# Patient Record
Sex: MALE | Race: WHITE | NOT HISPANIC OR LATINO | URBAN - METROPOLITAN AREA
[De-identification: names, ages, dates, MRNs, and addresses within clinical notes are randomized per-mention and may not be internally consistent; named-entity substitution may affect disease eponyms.]

---

## 2022-12-18 ENCOUNTER — HOSPITAL ENCOUNTER (OUTPATIENT)
Facility: HOSPITAL | Age: 52
Discharge: NURSING, REHAB OR LONG-TERM CARE | End: 2022-12-29
Attending: EMERGENCY MEDICINE | Admitting: INTERNAL MEDICINE
Payer: COMMERCIAL

## 2022-12-18 DIAGNOSIS — I63.9 CVA (CEREBRAL VASCULAR ACCIDENT): ICD-10-CM

## 2022-12-18 DIAGNOSIS — Q90.9 DOWN'S SYNDROME: Primary | ICD-10-CM

## 2022-12-18 DIAGNOSIS — R07.9 CHEST PAIN: ICD-10-CM

## 2022-12-18 DIAGNOSIS — R53.81 DEBILITY: ICD-10-CM

## 2022-12-18 DIAGNOSIS — R53.1 WEAKNESS: ICD-10-CM

## 2022-12-18 PROBLEM — E03.9 HYPOTHYROID: Status: ACTIVE | Noted: 2022-12-18

## 2022-12-18 LAB
ALBUMIN SERPL BCP-MCNC: 4 G/DL (ref 3.5–5.2)
ALP SERPL-CCNC: 89 U/L (ref 55–135)
ALT SERPL W/O P-5'-P-CCNC: 16 U/L (ref 10–44)
ANION GAP SERPL CALC-SCNC: 13 MMOL/L (ref 8–16)
AST SERPL-CCNC: 23 U/L (ref 10–40)
BASOPHILS # BLD AUTO: 0.06 K/UL (ref 0–0.2)
BASOPHILS NFR BLD: 0.6 % (ref 0–1.9)
BILIRUB SERPL-MCNC: 0.4 MG/DL (ref 0.1–1)
BILIRUB UR QL STRIP: NEGATIVE
BUN SERPL-MCNC: 22 MG/DL (ref 6–20)
CALCIUM SERPL-MCNC: 9.6 MG/DL (ref 8.7–10.5)
CHLORIDE SERPL-SCNC: 105 MMOL/L (ref 95–110)
CK SERPL-CCNC: 207 U/L (ref 20–200)
CLARITY UR: CLEAR
CO2 SERPL-SCNC: 24 MMOL/L (ref 23–29)
COLOR UR: COLORLESS
CREAT SERPL-MCNC: 1.3 MG/DL (ref 0.5–1.4)
DIFFERENTIAL METHOD: ABNORMAL
EOSINOPHIL # BLD AUTO: 0 K/UL (ref 0–0.5)
EOSINOPHIL NFR BLD: 0 % (ref 0–8)
ERYTHROCYTE [DISTWIDTH] IN BLOOD BY AUTOMATED COUNT: 13.3 % (ref 11.5–14.5)
EST. GFR  (NO RACE VARIABLE): >60 ML/MIN/1.73 M^2
GLUCOSE SERPL-MCNC: 87 MG/DL (ref 70–110)
GLUCOSE UR QL STRIP: NEGATIVE
HCT VFR BLD AUTO: 48.1 % (ref 40–54)
HGB BLD-MCNC: 15.7 G/DL (ref 14–18)
HGB UR QL STRIP: NEGATIVE
IMM GRANULOCYTES # BLD AUTO: 0.05 K/UL (ref 0–0.04)
IMM GRANULOCYTES NFR BLD AUTO: 0.5 % (ref 0–0.5)
KETONES UR QL STRIP: ABNORMAL
LEUKOCYTE ESTERASE UR QL STRIP: NEGATIVE
LYMPHOCYTES # BLD AUTO: 0.6 K/UL (ref 1–4.8)
LYMPHOCYTES NFR BLD: 5.5 % (ref 18–48)
MAGNESIUM SERPL-MCNC: 2.4 MG/DL (ref 1.6–2.6)
MCH RBC QN AUTO: 32.2 PG (ref 27–31)
MCHC RBC AUTO-ENTMCNC: 32.6 G/DL (ref 32–36)
MCV RBC AUTO: 99 FL (ref 82–98)
MONOCYTES # BLD AUTO: 0.5 K/UL (ref 0.3–1)
MONOCYTES NFR BLD: 4.6 % (ref 4–15)
NEUTROPHILS # BLD AUTO: 9.1 K/UL (ref 1.8–7.7)
NEUTROPHILS NFR BLD: 88.8 % (ref 38–73)
NITRITE UR QL STRIP: NEGATIVE
NRBC BLD-RTO: 0 /100 WBC
PH UR STRIP: 8 [PH] (ref 5–8)
PHOSPHATE SERPL-MCNC: 2.2 MG/DL (ref 2.7–4.5)
PLATELET # BLD AUTO: 208 K/UL (ref 150–450)
PMV BLD AUTO: 10.4 FL (ref 9.2–12.9)
POTASSIUM SERPL-SCNC: 4.7 MMOL/L (ref 3.5–5.1)
PROCALCITONIN SERPL IA-MCNC: 0.03 NG/ML
PROT SERPL-MCNC: 7.9 G/DL (ref 6–8.4)
PROT UR QL STRIP: NEGATIVE
RBC # BLD AUTO: 4.87 M/UL (ref 4.6–6.2)
SODIUM SERPL-SCNC: 142 MMOL/L (ref 136–145)
SP GR UR STRIP: 1.01 (ref 1–1.03)
T4 FREE SERPL-MCNC: 0.98 NG/DL (ref 0.71–1.51)
TROPONIN I SERPL DL<=0.01 NG/ML-MCNC: <0.006 NG/ML (ref 0–0.03)
TSH SERPL DL<=0.005 MIU/L-ACNC: 6.11 UIU/ML (ref 0.4–4)
URN SPEC COLLECT METH UR: ABNORMAL
UROBILINOGEN UR STRIP-ACNC: NEGATIVE EU/DL
WBC # BLD AUTO: 10.21 K/UL (ref 3.9–12.7)

## 2022-12-18 PROCEDURE — 63600175 PHARM REV CODE 636 W HCPCS: Performed by: EMERGENCY MEDICINE

## 2022-12-18 PROCEDURE — 84145 PROCALCITONIN (PCT): CPT | Performed by: INTERNAL MEDICINE

## 2022-12-18 PROCEDURE — G0378 HOSPITAL OBSERVATION PER HR: HCPCS

## 2022-12-18 PROCEDURE — 84443 ASSAY THYROID STIM HORMONE: CPT | Performed by: EMERGENCY MEDICINE

## 2022-12-18 PROCEDURE — 93010 ELECTROCARDIOGRAM REPORT: CPT | Mod: ,,, | Performed by: INTERNAL MEDICINE

## 2022-12-18 PROCEDURE — 81003 URINALYSIS AUTO W/O SCOPE: CPT | Performed by: EMERGENCY MEDICINE

## 2022-12-18 PROCEDURE — 82550 ASSAY OF CK (CPK): CPT | Performed by: EMERGENCY MEDICINE

## 2022-12-18 PROCEDURE — 96374 THER/PROPH/DIAG INJ IV PUSH: CPT

## 2022-12-18 PROCEDURE — 93005 ELECTROCARDIOGRAM TRACING: CPT

## 2022-12-18 PROCEDURE — 84484 ASSAY OF TROPONIN QUANT: CPT | Performed by: EMERGENCY MEDICINE

## 2022-12-18 PROCEDURE — 99285 EMERGENCY DEPT VISIT HI MDM: CPT | Mod: 25

## 2022-12-18 PROCEDURE — 85025 COMPLETE CBC W/AUTO DIFF WBC: CPT | Performed by: EMERGENCY MEDICINE

## 2022-12-18 PROCEDURE — 80053 COMPREHEN METABOLIC PANEL: CPT | Performed by: EMERGENCY MEDICINE

## 2022-12-18 PROCEDURE — 84100 ASSAY OF PHOSPHORUS: CPT | Performed by: EMERGENCY MEDICINE

## 2022-12-18 PROCEDURE — 83735 ASSAY OF MAGNESIUM: CPT | Performed by: EMERGENCY MEDICINE

## 2022-12-18 PROCEDURE — 84439 ASSAY OF FREE THYROXINE: CPT | Performed by: EMERGENCY MEDICINE

## 2022-12-18 PROCEDURE — 93010 EKG 12-LEAD: ICD-10-PCS | Mod: ,,, | Performed by: INTERNAL MEDICINE

## 2022-12-18 RX ORDER — GLUCAGON 1 MG
1 KIT INJECTION
Status: DISCONTINUED | OUTPATIENT
Start: 2022-12-18 | End: 2022-12-29 | Stop reason: HOSPADM

## 2022-12-18 RX ORDER — SODIUM CHLORIDE 0.9 % (FLUSH) 0.9 %
10 SYRINGE (ML) INJECTION EVERY 12 HOURS PRN
Status: DISCONTINUED | OUTPATIENT
Start: 2022-12-18 | End: 2022-12-29 | Stop reason: HOSPADM

## 2022-12-18 RX ORDER — LEVOTHYROXINE SODIUM 88 UG/1
88 TABLET ORAL
COMMUNITY

## 2022-12-18 RX ORDER — NALOXONE HCL 0.4 MG/ML
0.02 VIAL (ML) INJECTION
Status: DISCONTINUED | OUTPATIENT
Start: 2022-12-18 | End: 2022-12-29 | Stop reason: HOSPADM

## 2022-12-18 RX ORDER — LORAZEPAM 2 MG/ML
1 INJECTION INTRAMUSCULAR
Status: COMPLETED | OUTPATIENT
Start: 2022-12-18 | End: 2022-12-18

## 2022-12-18 RX ORDER — IBUPROFEN 200 MG
16 TABLET ORAL
Status: DISCONTINUED | OUTPATIENT
Start: 2022-12-18 | End: 2022-12-29 | Stop reason: HOSPADM

## 2022-12-18 RX ORDER — LORATADINE 10 MG/1
10 TABLET ORAL DAILY
COMMUNITY

## 2022-12-18 RX ORDER — IBUPROFEN 200 MG
24 TABLET ORAL
Status: DISCONTINUED | OUTPATIENT
Start: 2022-12-18 | End: 2022-12-29 | Stop reason: HOSPADM

## 2022-12-18 RX ADMIN — LORAZEPAM 1 MG: 2 INJECTION INTRAMUSCULAR; INTRAVENOUS at 12:12

## 2022-12-18 NOTE — SUBJECTIVE & OBJECTIVE
No past medical history on file.    No past surgical history on file.    Review of patient's allergies indicates:  No Known Allergies    No current facility-administered medications on file prior to encounter.     Current Outpatient Medications on File Prior to Encounter   Medication Sig    levothyroxine (SYNTHROID) 88 MCG tablet Take 88 mcg by mouth before breakfast.    loratadine (CLARITIN) 10 mg tablet Take 10 mg by mouth once daily.     Family History    None       Tobacco Use    Smoking status: Not on file    Smokeless tobacco: Not on file   Substance and Sexual Activity    Alcohol use: Not on file    Drug use: Not on file    Sexual activity: Not on file     Review of Systems   Unable to perform ROS: Patient nonverbal   Objective:     Vital Signs (Most Recent):  Temp: 97.9 °F (36.6 °C) (12/18/22 0841)  Pulse: 104 (12/18/22 1410)  Resp: 20 (12/18/22 1410)  BP: 134/68 (12/18/22 1410)  SpO2: 99 % (12/18/22 1410) Vital Signs (24h Range):  Temp:  [97.9 °F (36.6 °C)] 97.9 °F (36.6 °C)  Pulse:  [] 104  Resp:  [14-20] 20  SpO2:  [95 %-100 %] 99 %  BP: (111-134)/(60-76) 134/68     Weight: 66.6 kg (146 lb 12.8 oz)  There is no height or weight on file to calculate BMI.    Physical Exam  HENT:      Head: Normocephalic and atraumatic.   Cardiovascular:      Rate and Rhythm: Normal rate and regular rhythm.      Heart sounds: No murmur heard.  Pulmonary:      Effort: Pulmonary effort is normal. No respiratory distress.      Breath sounds: Normal breath sounds. No wheezing.   Abdominal:      General: Bowel sounds are normal. There is no distension.      Palpations: Abdomen is soft.      Tenderness: There is no abdominal tenderness.   Musculoskeletal:         General: No swelling.   Skin:     General: Skin is warm and dry.   Neurological:      Mental Status: He is alert.      Motor: Weakness present.           Significant Labs: All pertinent labs within the past 24 hours have been reviewed.  CBC:   Recent Labs   Lab  12/18/22  1053   WBC 10.21   HGB 15.7   HCT 48.1        CMP:   Recent Labs   Lab 12/18/22  1053      K 4.7      CO2 24   GLU 87   BUN 22*   CREATININE 1.3   CALCIUM 9.6   PROT 7.9   ALBUMIN 4.0   BILITOT 0.4   ALKPHOS 89   AST 23   ALT 16   ANIONGAP 13       Significant Imaging: I have reviewed all pertinent imaging results/findings within the past 24 hours.

## 2022-12-18 NOTE — H&P
[facility] - Emergency Dept.  [facility] Medicine  History & Physical    Patient Name: Albaro Langley  MRN: [id number]  Patient Class: OP- Observation  Admission Date: 12/18/2022  Attending Physician: Maday Brice MD   Primary Care Provider: Primary Doctor No         Patient information was obtained from relative(s) and ER records.     Subjective:     Principal Problem:Acute weakness    Chief Complaint:   Chief Complaint   Patient presents with    Cerebrovascular Accident     Patient coming from group home. Facility states they noticed this morning the patient was leaning/favoring his left side. Pt does not have previous left sided deficit, last known well 7pm last night. Patient non verbal.         HPI: The patient is 51 yo male with past medical history of Down's syndrome and hypothyroidism who presented to the ED with acute onset of weakness. He was last seen normal around 1900. His caregiver woke him of around 0530 this morning and told him get dressed. The patient fell and was unable to dress himself. He also seemed confused per the caregiver. He was transported to ED via EMS as caregiver concerned patient may have had a stroke given his weakness and leaning to the left. His brother and sister were at bedside. Patient recently started soiling his pants. CT head no acute findings. Patient  unable to complete MRI. Hospital medicine consulted. Discussed additional labs and repeat head CT in am. Patient placed in observation.    Past Medical History:   Diagnosis Date    Thyroid disease         past surgical history not pertinent    Review of patient's allergies indicates:  No Known Allergies    No current facility-administered medications on file prior to encounter.     Current Outpatient Medications on File Prior to Encounter   Medication Sig    levothyroxine (SYNTHROID) 88 MCG tablet Take 88 mcg by mouth before breakfast.    loratadine (CLARITIN) 10 mg tablet Take 10 mg by mouth once daily.     Family History     Reviewed not pertinent       Tobacco Use    Smoking status: Not on file    Smokeless tobacco: Not on file   Substance and Sexual Activity    Alcohol use: Not on file    Drug use: Not on file    Sexual activity: Not on file     Review of Systems   Unable to perform ROS: Patient nonverbal   Objective:     Vital Signs (Most Recent):  Temp: 97.9 °F (36.6 °C) (12/18/22 0841)  Pulse: 104 (12/18/22 1410)  Resp: 20 (12/18/22 1410)  BP: 134/68 (12/18/22 1410)  SpO2: 99 % (12/18/22 1410) Vital Signs (24h Range):  Temp:  [97.9 °F (36.6 °C)] 97.9 °F (36.6 °C)  Pulse:  [] 104  Resp:  [14-20] 20  SpO2:  [95 %-100 %] 99 %  BP: (111-134)/(60-76) 134/68     Weight: 66.6 kg (146 lb 12.8 oz)  There is no height or weight on file to calculate BMI.    Physical Exam  HENT:      Head: Normocephalic and atraumatic.   Cardiovascular:      Rate and Rhythm: Normal rate and regular rhythm.      Heart sounds: No murmur heard.  Pulmonary:      Effort: Pulmonary effort is normal. No respiratory distress.      Breath sounds: Normal breath sounds. No wheezing.   Abdominal:      General: Bowel sounds are normal. There is no distension.      Palpations: Abdomen is soft.      Tenderness: There is no abdominal tenderness.   Musculoskeletal:         General: No swelling.   Skin:     General: Skin is warm and dry.   Neurological:      Mental Status: He is alert.      Motor: Weakness present.           Significant Labs: All pertinent labs within the past 24 hours have been reviewed.  CBC:   Recent Labs   Lab 12/18/22  1053   WBC 10.21   HGB 15.7   HCT 48.1        CMP:   Recent Labs   Lab 12/18/22  1053      K 4.7      CO2 24   GLU 87   BUN 22*   CREATININE 1.3   CALCIUM 9.6   PROT 7.9   ALBUMIN 4.0   BILITOT 0.4   ALKPHOS 89   AST 23   ALT 16   ANIONGAP 13       Significant Imaging: I have reviewed all pertinent imaging results/findings within the past 24 hours.    Assessment/Plan:     * Acute weakness  CT head no acute  findings  MRI non-diagnostic due to movement  Obtain chest and pelvic x-ray  Labs unrevealing as well  Repeat head CT in am  PT/OT evaluation as unable to  ED      Down syndrome  Usually ambulates without assistance at group home  May need placement if unable to ambulate   Assist with meals      Hypothyroid  Continue levothyroxine TSH elevated but free T4 within normal limits      VTE Risk Mitigation (From admission, onward)         Ordered     IP VTE LOW RISK PATIENT  Once         12/18/22 1653     Place sequential compression device  Until discontinued         12/18/22 1653                   Maday Brice MD  Department of Hospital Medicine   '[facility] - Emergency Dept.

## 2022-12-18 NOTE — ED NOTES
Received report on patient, assumed care. Patient resting quietly, no distress. Respirations even/unlabored. Bed in low position, side rails up x 2, call light in reach.    87

## 2022-12-18 NOTE — ED PROVIDER NOTES
SCRIBE #1 NOTE: I, Lisset Brice/Joana Greenwood, am scribing for, and in the presence of, Yvonne Monson MD. I have scribed the entire note.      History      Chief Complaint   Patient presents with    Cerebrovascular Accident     Patient coming from group home. Facility states they noticed this morning the patient was leaning/favoring his left side. Pt does not have previous left sided deficit, last known well 7pm last night. Patient non verbal.        Review of patient's allergies indicates:  No Known Allergies     HPI   HPI    12/18/2022, 10:26 AM   History obtained from the pt's caregiver  HPI/ROS limited due to pt being nonverbal.      History of Present Illness: Albaro Langley is a 52 y.o. male patient  with a PMHx of Down's syndrome and hypothyroid who presents to the Emergency Department for a possible CVA. Per the pt's caregiver, the pt was acting normal yesterday and went to bed at 1900 normal. At 0530 this morning, the pt's caregiver woke the pt up and told him to put on his clothes, which he normally does himself. When he attempted to put his clothes on, he fell, so the pt's caregiver got him back onto the bed and told him to put his clothes on. Per the pt's caregiver, the pt seemed confused and unsure of what to do when she asked him to put on his clothes, so she walked him to the bathroom, put him on the commode, and asked him to put his clothes on again. The pt's caregiver left the bathroom, but then heard the pt fall again. After the pt's second fall, the pt's caregiver could not get the pt up, so she called for EMS. The pt's caregiver states that the pt is weak on his R side, cannot move his R leg, and is leaning to the L which is abnormal. Per the pt's caregiver, the pt is normally able to ambulate by himself without assistance.  Pt's caregiver states that the pt did not have any seizures or syncope today.      Arrival mode: EMS    PCP: Primary Doctor No       Past Medical History:  Past Medical  History:   Diagnosis Date    Thyroid disease        Past Surgical History:  No past surgical history on file.      Family History:  No family history on file.    Social History:  Social History     Tobacco Use    Smoking status: Not on file    Smokeless tobacco: Not on file   Substance and Sexual Activity    Alcohol use: Not on file    Drug use: Not on file    Sexual activity: Not on file       ROS   Review of Systems   Unable to perform ROS: Patient nonverbal     Physical Exam      Initial Vitals [12/18/22 0841]   BP Pulse Resp Temp SpO2   112/72 90 15 97.9 °F (36.6 °C) 95 %      MAP       --          Physical Exam  Nursing Notes and Vital Signs Reviewed.  Constitutional: Patient is in no acute distress. Well-developed and well-nourished.  Head: Atraumatic. Normocephalic.  Eyes: PERRL. EOM intact. Conjunctivae are not pale. No scleral icterus.  ENT: Mucous membranes are moist. Oropharynx is clear and symmetric.   Mouth: Poor dentition.   Neck: Supple. Full ROM. No lymphadenopathy.  Cardiovascular: Regular rate. Regular rhythm. No murmurs, rubs, or gallops. Distal pulses are 2+ and symmetric.  Pulmonary/Chest: No respiratory distress. Clear to auscultation bilaterally. No wheezing or rales.  Abdominal: Soft and non-distended.  There is no tenderness.  No rebound, guarding, or rigidity.   Musculoskeletal: Moves all extremities. No obvious deformities. No edema.  Skin: Warm and dry.  Neurological:  Awake. Pt is not able to stand, but has no focal weakness. Pt follows some directions, but not all commands which is normal, per the pt's caregiver. Pt has difficulty sitting up. Reflexes are 2+ and equal bilaterally.   Psychiatric: Normal affect. Good eye contact. Appropriate in content.    ED Course    Procedures  ED Vital Signs:  Vitals:    12/20/22 0315 12/20/22 0400 12/20/22 0512 12/20/22 0550   BP:  111/67 111/67    Pulse: 101 63 63 (!) 59   Resp:  18 18    Temp:  98.5 °F (36.9 °C) 98.5 °F (36.9 °C)    TempSrc:   Oral     SpO2:  95% 95%    Weight:       Height:        12/20/22 0806 12/20/22 1220 12/20/22 1635 12/20/22 2037   BP: (!) 95/56 133/66 136/62 108/60   Pulse: 65 100 101 102   Resp: 17 17 18 17   Temp: 99 °F (37.2 °C) 98.8 °F (37.1 °C) 97.9 °F (36.6 °C) 100 °F (37.8 °C)   TempSrc: Oral Oral Oral Oral   SpO2: (!) 94% 97% 97% 97%   Weight:       Height:        12/20/22 2115 12/20/22 2315 12/21/22 0013 12/21/22 0135   BP:   107/68    Pulse: 97 88 109 93   Resp:   17    Temp:   97.8 °F (36.6 °C)    TempSrc:   Oral    SpO2:   97%    Weight:   60.1 kg (132 lb 7.9 oz)    Height:        12/21/22 0335 12/21/22 0404 12/21/22 0405   BP:  (!) 99/56 (!) 99/56   Pulse: 81 84 88   Resp:  17 17   Temp:  97.5 °F (36.4 °C) 97.6 °F (36.4 °C)   TempSrc:  Oral Oral   SpO2:  (!) 94% (!) 94%   Weight:      Height:          Abnormal Lab Results:  Labs Reviewed   CBC W/ AUTO DIFFERENTIAL - Abnormal; Notable for the following components:       Result Value    MCV 99 (*)     MCH 32.2 (*)     Gran # (ANC) 9.1 (*)     Immature Grans (Abs) 0.05 (*)     Lymph # 0.6 (*)     Gran % 88.8 (*)     Lymph % 5.5 (*)     All other components within normal limits   COMPREHENSIVE METABOLIC PANEL - Abnormal; Notable for the following components:    BUN 22 (*)     All other components within normal limits   URINALYSIS, REFLEX TO URINE CULTURE - Abnormal; Notable for the following components:    Color, UA Colorless (*)     Ketones, UA Trace (*)     All other components within normal limits    Narrative:     Specimen Source->Urine   TSH - Abnormal; Notable for the following components:    TSH 6.106 (*)     All other components within normal limits   PHOSPHORUS - Abnormal; Notable for the following components:    Phosphorus 2.2 (*)     All other components within normal limits   CK - Abnormal; Notable for the following components:     (*)     All other components within normal limits   TROPONIN I   MAGNESIUM   T4, FREE        All Lab Results:  Results for orders  placed or performed during the hospital encounter of 12/18/22   CBC auto differential   Result Value Ref Range    WBC 10.21 3.90 - 12.70 K/uL    RBC 4.87 4.60 - 6.20 M/uL    Hemoglobin 15.7 14.0 - 18.0 g/dL    Hematocrit 48.1 40.0 - 54.0 %    MCV 99 (H) 82 - 98 fL    MCH 32.2 (H) 27.0 - 31.0 pg    MCHC 32.6 32.0 - 36.0 g/dL    RDW 13.3 11.5 - 14.5 %    Platelets 208 150 - 450 K/uL    MPV 10.4 9.2 - 12.9 fL    Immature Granulocytes 0.5 0.0 - 0.5 %    Gran # (ANC) 9.1 (H) 1.8 - 7.7 K/uL    Immature Grans (Abs) 0.05 (H) 0.00 - 0.04 K/uL    Lymph # 0.6 (L) 1.0 - 4.8 K/uL    Mono # 0.5 0.3 - 1.0 K/uL    Eos # 0.0 0.0 - 0.5 K/uL    Baso # 0.06 0.00 - 0.20 K/uL    nRBC 0 0 /100 WBC    Gran % 88.8 (H) 38.0 - 73.0 %    Lymph % 5.5 (L) 18.0 - 48.0 %    Mono % 4.6 4.0 - 15.0 %    Eosinophil % 0.0 0.0 - 8.0 %    Basophil % 0.6 0.0 - 1.9 %    Differential Method Automated    Comprehensive metabolic panel   Result Value Ref Range    Sodium 142 136 - 145 mmol/L    Potassium 4.7 3.5 - 5.1 mmol/L    Chloride 105 95 - 110 mmol/L    CO2 24 23 - 29 mmol/L    Glucose 87 70 - 110 mg/dL    BUN 22 (H) 6 - 20 mg/dL    Creatinine 1.3 0.5 - 1.4 mg/dL    Calcium 9.6 8.7 - 10.5 mg/dL    Total Protein 7.9 6.0 - 8.4 g/dL    Albumin 4.0 3.5 - 5.2 g/dL    Total Bilirubin 0.4 0.1 - 1.0 mg/dL    Alkaline Phosphatase 89 55 - 135 U/L    AST 23 10 - 40 U/L    ALT 16 10 - 44 U/L    Anion Gap 13 8 - 16 mmol/L    eGFR >60 >60 mL/min/1.73 m^2   Troponin I   Result Value Ref Range    Troponin I <0.006 0.000 - 0.026 ng/mL   Urinalysis, Reflex to Urine Culture Urine, Clean Catch    Specimen: Urine   Result Value Ref Range    Specimen UA Urine, Clean Catch     Color, UA Colorless (A) Yellow, Straw, Valerie    Appearance, UA Clear Clear    pH, UA 8.0 5.0 - 8.0    Specific Gravity, UA 1.010 1.005 - 1.030    Protein, UA Negative Negative    Glucose, UA Negative Negative    Ketones, UA Trace (A) Negative    Bilirubin (UA) Negative Negative    Occult Blood UA Negative  Negative    Nitrite, UA Negative Negative    Urobilinogen, UA Negative <2.0 EU/dL    Leukocytes, UA Negative Negative   TSH   Result Value Ref Range    TSH 6.106 (H) 0.400 - 4.000 uIU/mL   Magnesium   Result Value Ref Range    Magnesium 2.4 1.6 - 2.6 mg/dL   Phosphorus   Result Value Ref Range    Phosphorus 2.2 (L) 2.7 - 4.5 mg/dL   CPK   Result Value Ref Range     (H) 20 - 200 U/L   T4, Free   Result Value Ref Range    Free T4 0.98 0.71 - 1.51 ng/dL   Procalcitonin   Result Value Ref Range    Procalcitonin 0.03 <0.25 ng/mL   Hemoglobin A1c   Result Value Ref Range    Hemoglobin A1C 5.0 4.0 - 5.6 %    Estimated Avg Glucose 97 68 - 131 mg/dL   Echo   Result Value Ref Range    BSA 1.67 m2    LA WIDTH 2.20 cm    Left Ventricular Outflow Tract Mean Velocity 0.77 cm/s    Left Ventricular Outflow Tract Mean Gradient 2.51 mmHg    LVIDd 3.54 3.5 - 6.0 cm    IVS 1.31 (A) 0.6 - 1.1 cm    Posterior Wall 1.05 0.6 - 1.1 cm    Ao root annulus 3.39 cm    LVIDs 2.39 2.1 - 4.0 cm    FS 32 28 - 44 %    LA volume 15.03 cm3    STJ 3.36 cm    Ascending aorta 3.19 cm    LV mass 134.57 g    LA size 2.34 cm    Left Ventricle Relative Wall Thickness 0.59 cm    AV regurgitation pressure 1/2 time [id number] ms    AV mean gradient 6 mmHg    AV valve area 2.32 cm2    AV Velocity Ratio 0.72     AV index (prosthetic) 0.84     MV valve area p 1/2 method 2.92 cm2    E/A ratio 1.29     E wave deceleration time 259.42 msec    IVRT 74.22 msec    LVOT diameter 1.88 cm    LVOT area 2.8 cm2    LVOT peak manuel 1.02 m/s    LVOT peak VTI 21.60 cm    Ao peak manuel 1.41 m/s    Ao VTI 25.8 cm    RVOT peak manuel 0.66 m/s    RVOT peak VTI 15.1 cm    LVOT stroke volume 59.93 cm3    AV peak gradient 8 mmHg    PV mean gradient 1.42 mmHg    MV Peak E Manuel 0.72 m/s    AR Max Manuel 2.54 m/s    TR Max Manuel 2.55 m/s    MV stenosis pressure 1/2 time 75.23 ms    MV Peak A Manuel 0.56 m/s    LV Systolic Volume 19.92 mL    LV Systolic Volume Index 12.1 mL/m2    LV  Diastolic Volume 52.39 mL    LV Diastolic Volume Index 31.75 mL/m2    LA Volume Index 9.1 mL/m2    LV Mass Index 82 g/m2    RA Major Axis 3.52 cm    Left Atrium Minor Axis 3.42 cm    Left Atrium Major Axis 3.45 cm    Triscuspid Valve Regurgitation Peak Gradient 26 mmHg    EF 60 %         Imaging Results:  Imaging Results              X-Ray Pelvis Routine AP (Final result)  Result time 12/18/22 18:06:58   Procedure changed from X-Ray Pelvis Complete min 3 views     Final result by Jaxon Torres MD (12/18/22 18:06:58)                   Impression:      As above.      Electronically signed by: Jaxon Torres  Date:    12/18/2022  Time:    18:06               Narrative:    EXAMINATION:  XR PELVIS ROUTINE AP    CLINICAL HISTORY:  unable to bear weight;    TECHNIQUE:  AP view of the pelvis was performed.    COMPARISON:  None.    FINDINGS:  Severe degenerative change of the right hip.  No definite fracture identified on this single view exam.  Evaluation for fracture of the right hip is suboptimal.  If there is high clinical concern, further evaluation would be suggested.    Left hip appears intact mild degenerative change.                                       X-Ray Chest 1 View (Final result)  Result time 12/18/22 18:07:16      Final result by Jaxon Torres MD (12/18/22 18:07:16)                   Impression:      No acute abnormality.      Electronically signed by: Jaxon Torres  Date:    12/18/2022  Time:    18:07               Narrative:    EXAMINATION:  XR CHEST 1 VIEW    CLINICAL HISTORY:  weakness;    TECHNIQUE:  Single frontal view of the chest was performed.    COMPARISON:  None    FINDINGS:  The lungs are clear, with normal appearance of pulmonary vasculature and no pleural effusion or pneumothorax.    The cardiac silhouette is normal in size. The hilar and mediastinal contours are unremarkable.    Degenerative change of the shoulders.                                       MRI Brain Without Contrast  (Final result)  Result time 12/18/22 13:47:49      Final result by Alfonso Layton MD (12/18/22 13:47:49)                   Impression:      Nondiagnostic exam.  This exam can be repeated when the patient can cooperate with lying still for the exam or with sedation.      Electronically signed by: Alfonso Layton MD  Date:    12/18/2022  Time:    13:47               Narrative:    EXAMINATION:  MRI BRAIN WITHOUT CONTRAST    CLINICAL HISTORY:  Transient ischemic attack (TIA);    TECHNIQUE:  Multiplanar multisequence MR imaging of the brain was performed without contrast.    COMPARISON:  Head CT, 12/18/2022.    FINDINGS:  This exam is nondiagnostic due to motion artifact.                                       CT Head Without Contrast (Final result)  Result time 12/18/22 09:29:59      Final result by Alfonso Layton MD (12/18/22 09:29:59)                   Impression:      1.  No CT evidence of acute intracranial abnormality.    2.  Mild to moderate brain atrophy.    All CT scans at this facility are performed  using dose modulation techniques as appropriate to performed exam including the following:  automated exposure control; adjustment of mA and/or kV according to the patients size (this includes techniques or standardized protocols for targeted exams where dose is matched to indication/reason for exam: i.e. extremities or head);  iterative reconstruction technique.      Electronically signed by: Alfonso Layton MD  Date:    12/18/2022  Time:    09:29               Narrative:    EXAMINATION:  CT HEAD WITHOUT CONTRAST    CLINICAL HISTORY:  Neuro deficit, acute, stroke suspected;    TECHNIQUE:  Axial CT imaging was performed through the head without intravenous contrast.    COMPARISON:  None    FINDINGS:  No hydrocephalus, midline shift, mass effect, or acute intracranial hemorrhage. Senescent calcifications of the basal ganglia.  Mild to moderate brain atrophy.  The visualized paranasal sinuses and mastoid air cells are  clear. The skull is intact.                                     The EKG was ordered, reviewed, and independently interpreted by the ED provider.  Interpretation time: 10:41  Rate: 73 BPM  Rhythm: Sinus rhythm with occasional premature ventricular complexes  Interpretation: Right bundle branch block. Left anterior fascicular block. Bifascicular block. No STEMI.             The Emergency Provider reviewed the vital signs and test results, which are outlined above.    ED Discussion     10:26 AM: Pt's caregiver is requesting for Nick Acevedo to be contacted at [phone] when the pt is discharged.    2:23 PM: Re-evaluated pt. Pt's family is now at bedside. Pt is still unable to stand up even with assistance. D/w pt and pt's family all pertinent results. D/w pt and pt's family any concerns expressed at this time. Answered all questions. Pt and pt's family express understanding at this time.    3:14 PM: Spoke with the pt's sister and brother who state that the pt only takes thyroid medication.    3:26 PM: Discussed case with Dr. Brice ([facility]). Dr. Brice agrees with current care and management of pt and accepts admission.   Admitting Service: [facility]  Admitting Physician: Dr. Brice  Admit to: Obs    3:27 PM: Re-evaluated pt. I have discussed test results, shared treatment plan, and the need for admission with patient and family at bedside. Pt and family express understanding at this time and agree with all information. All questions answered. Pt and family have no further questions or concerns at this time. Pt is ready for admit.         ED Medication(s):  Medications   sodium chloride 0.9% flush 10 mL (has no administration in time range)   naloxone 0.4 mg/mL injection 0.02 mg (has no administration in time range)   glucose chewable tablet 16 g (has no administration in time range)   glucose chewable tablet 24 g (has no administration in time range)   glucagon (human recombinant) injection  1 mg (has no administration in time range)   dextrose 10% bolus 125 mL 125 mL (has no administration in time range)   dextrose 10% bolus 250 mL 250 mL (has no administration in time range)   aspirin EC tablet 81 mg (81 mg Oral Given 12/20/22 0947)   clopidogreL tablet 75 mg (75 mg Oral Given 12/20/22 0947)   levothyroxine tablet 88 mcg (88 mcg Oral Given 12/20/22 0621)   LORazepam injection 1 mg (1 mg Intravenous Given 12/18/22 1246)   iohexoL (OMNIPAQUE 350) injection 75 mL (75 mLs Intravenous Given 12/19/22 1459)           Current Discharge Medication List            Medical Decision Making    Medical Decision Making:   Clinical Tests:   Lab Tests: Ordered and Reviewed  Radiological Study: Ordered and Reviewed  Medical Tests: Ordered and Reviewed         Scribe Attestation:   Scribe #1: I performed the above scribed service and the documentation accurately describes the services I performed. I attest to the accuracy of the note.    Attending:   Physician Attestation Statement for Scribe #1: I, Yvonne Monson MD, personally performed the services described in this documentation, as scribed by Lisset Brice/Joana Greenwood, in my presence, and it is both accurate and complete.          Clinical Impression       ICD-10-CM ICD-9-CM   1. Down's syndrome  Q90.9 758.0   2. Weakness  R53.1 780.79   3. Chest pain  R07.9 786.50   4. CVA (cerebral vascular accident)  I63.9 434.91       Disposition:   Disposition: Placed in Observation  Condition: Fair       Yvonne Monson MD  12/21/22 0248

## 2022-12-18 NOTE — HPI
The patient is 53 yo male with past medical history of Down's syndrome and hypothyroidism who presented to the ED with acute onset of weakness. He was last seen normal around 1900. His caregiver woke him of around 0530 this morning and told him get dressed. The patient fell and was unable to dress himself. He also seemed confused per the caregiver. He was transported to ED via EMS as caregiver concerned patient may have had a stroke given his weakness and leaning to the left. His brother and sister were at bedside. Patient recently started soiling his pants. CT head no acute findings. Patient  unable to complete MRI. Hospital medicine consulted. Discussed additional labs and repeat head CT in am. Patient placed in observation.

## 2022-12-18 NOTE — PHARMACY MED REC
"Admission Medication History     The home medication history was taken by Chance Graham.    You may go to "Admission" then "Reconcile Home Medications" tabs to review and/or act upon these items.     The home medication list has been updated by the Pharmacy department.   Please read ALL comments highlighted in yellow.   Please address this information as you see fit.    Feel free to contact us if you have any questions or require assistance.    Medications listed below were obtained from: Patient/family  (Not in a hospital admission)      Potential issues to be addressed PRIOR TO DISCHARGE: NONE    Chance Graham CPhT-Adv  Pharmacy Technician Specialist-Medication History  [facility] [phone]  Secure chat preferred     Current Outpatient Medications on File Prior to Encounter   Medication Sig Dispense Refill Last Dose    levothyroxine (SYNTHROID) 88 MCG tablet Take 88 mcg by mouth before breakfast.   12/18/2022    loratadine (CLARITIN) 10 mg tablet Take 10 mg by mouth once daily.   12/18/2022                           .        "

## 2022-12-18 NOTE — ASSESSMENT & PLAN NOTE
CT head no acute findings  MRI non-diagnostic due to movement  Obtain chest and pelvic x-ray  Labs unrevealing as well  Repeat head CT in am  PT/OT evaluation as unable to  ED

## 2022-12-18 NOTE — ASSESSMENT & PLAN NOTE
Usually ambulates without assistance at group home  May need placement if unable to ambulate   Assist with meals

## 2022-12-19 LAB
AORTIC ROOT ANNULUS: 3.39 CM
ASCENDING AORTA: 3.19 CM
AV INDEX (PROSTH): 0.84
AV MEAN GRADIENT: 6 MMHG
AV PEAK GRADIENT: 8 MMHG
AV REGURGITATION PRESSURE HALF TIME: 761.19 MS
AV VALVE AREA: 2.32 CM2
AV VELOCITY RATIO: 0.72
BSA FOR ECHO PROCEDURE: 1.67 M2
CV ECHO LV RWT: 0.59 CM
DOP CALC AO PEAK VEL: 1.41 M/S
DOP CALC AO VTI: 25.8 CM
DOP CALC LVOT AREA: 2.8 CM2
DOP CALC LVOT DIAMETER: 1.88 CM
DOP CALC LVOT PEAK VEL: 1.02 M/S
DOP CALC LVOT STROKE VOLUME: 59.93 CM3
DOP CALC RVOT PEAK VEL: 0.66 M/S
DOP CALC RVOT VTI: 15.1 CM
DOP CALCLVOT PEAK VEL VTI: 21.6 CM
E WAVE DECELERATION TIME: 259.42 MSEC
E/A RATIO: 1.29
ECHO LV POSTERIOR WALL: 1.05 CM (ref 0.6–1.1)
EJECTION FRACTION: 60 %
FRACTIONAL SHORTENING: 32 % (ref 28–44)
INTERVENTRICULAR SEPTUM: 1.31 CM (ref 0.6–1.1)
IVRT: 74.22 MSEC
LA MAJOR: 3.45 CM
LA MINOR: 3.42 CM
LA WIDTH: 2.2 CM
LEFT ATRIUM SIZE: 2.34 CM
LEFT ATRIUM VOLUME INDEX: 9.1 ML/M2
LEFT ATRIUM VOLUME: 15.03 CM3
LEFT INTERNAL DIMENSION IN SYSTOLE: 2.39 CM (ref 2.1–4)
LEFT VENTRICLE DIASTOLIC VOLUME INDEX: 31.75 ML/M2
LEFT VENTRICLE DIASTOLIC VOLUME: 52.39 ML
LEFT VENTRICLE MASS INDEX: 82 G/M2
LEFT VENTRICLE SYSTOLIC VOLUME INDEX: 12.1 ML/M2
LEFT VENTRICLE SYSTOLIC VOLUME: 19.92 ML
LEFT VENTRICULAR INTERNAL DIMENSION IN DIASTOLE: 3.54 CM (ref 3.5–6)
LEFT VENTRICULAR MASS: 134.57 G
LVOT MG: 2.51 MMHG
LVOT MV: 0.77 CM/S
MV PEAK A VEL: 0.56 M/S
MV PEAK E VEL: 0.72 M/S
MV STENOSIS PRESSURE HALF TIME: 75.23 MS
MV VALVE AREA P 1/2 METHOD: 2.92 CM2
PISA AR MAX VEL: 2.54 M/S
PISA TR MAX VEL: 2.55 M/S
PV MEAN GRADIENT: 1.42 MMHG
RA MAJOR: 3.52 CM
STJ: 3.36 CM
TR MAX PG: 26 MMHG

## 2022-12-19 PROCEDURE — 25000003 PHARM REV CODE 250: Performed by: NURSE PRACTITIONER

## 2022-12-19 PROCEDURE — G0378 HOSPITAL OBSERVATION PER HR: HCPCS

## 2022-12-19 PROCEDURE — 97163 PT EVAL HIGH COMPLEX 45 MIN: CPT

## 2022-12-19 PROCEDURE — 97166 OT EVAL MOD COMPLEX 45 MIN: CPT

## 2022-12-19 PROCEDURE — 97530 THERAPEUTIC ACTIVITIES: CPT

## 2022-12-19 PROCEDURE — 25500020 PHARM REV CODE 255: Performed by: INTERNAL MEDICINE

## 2022-12-19 RX ORDER — ASPIRIN 81 MG/1
81 TABLET ORAL DAILY
Status: DISCONTINUED | OUTPATIENT
Start: 2022-12-19 | End: 2022-12-29 | Stop reason: HOSPADM

## 2022-12-19 RX ORDER — CLOPIDOGREL BISULFATE 75 MG/1
75 TABLET ORAL DAILY
Status: DISCONTINUED | OUTPATIENT
Start: 2022-12-19 | End: 2022-12-29 | Stop reason: HOSPADM

## 2022-12-19 RX ORDER — LEVOTHYROXINE SODIUM 88 UG/1
88 TABLET ORAL
Status: DISCONTINUED | OUTPATIENT
Start: 2022-12-20 | End: 2022-12-29 | Stop reason: HOSPADM

## 2022-12-19 RX ADMIN — ASPIRIN 81 MG: 81 TABLET, COATED ORAL at 10:12

## 2022-12-19 RX ADMIN — CLOPIDOGREL BISULFATE 75 MG: 75 TABLET ORAL at 10:12

## 2022-12-19 RX ADMIN — IOHEXOL 75 ML: 350 INJECTION, SOLUTION INTRAVENOUS at 02:12

## 2022-12-19 NOTE — TREATMENT PLAN
Updated sister Maurizio Arndt with POC and results of CTA head and neck. CTA head and neck showed Minimal mild atherosclerotic changes.  No evidence for hemodynamically significant stenosis of the a carotid and vertebral arteries of the cervical neck.  No hemodynamically significant stenosis of the intracranial vasculature. CTH can take up to 10 days to show a stroke.    Will initiate DAPT since there is definitely a change from patient's baseline and unable to obtain MRI to confirm stroke. Goal 's and will allow for permissive HTN for 24 hours.

## 2022-12-19 NOTE — PT/OT/SLP EVAL
Occupational Therapy Evaluation and Treatment    Name: Albaro Langley  MRN: [id number]  Admitting Diagnosis: Acute weakness  Recent Surgery: * No surgery found *      Recommendations:     Discharge Recommendations: nursing facility, skilled  Level of Assistance Recommended: 24 hours significant assistance  Discharge Equipment Recommendations:  (TBD at next level of care)  Barriers to discharge:  (? level of physical A at d/c)    Assessment:     Albaro Langley is a 52 y.o. male with a medical diagnosis of Acute weakness. He presents with performance deficits affecting function including weakness, impaired endurance, impaired self care skills, impaired functional mobility, impaired balance, pain, decreased safety awareness, impaired cognition, impaired cardiopulmonary response to activity, edema, impaired skin, impaired fine motor.    Rehab Prognosis: Fair and Poor; patient would benefit from acute skilled OT services to address these deficits and reach maximum level of function.    Plan:     Patient to be seen 2 x/week to address the above listed problems via self-care/home management, therapeutic activities, therapeutic exercises  Plan of Care Expires: 01/02/23  Plan of Care Reviewed with: patient    Subjective     Chief Complaint: Minimal verbalizations throughout. Limited ability to communicate needs and pain.  Patient Comments/Goals: unable to state  Pain/Comfort:  Pain Rating 1:  (unable to rate. noted to have redness and nonverbal indicators of pain at B feet/toes)  Location - Side 1: Bilateral  Location 1: foot  Pain Addressed 1: Nurse notified (activity pacing)  Pain Rating Post-Intervention 1:  (no nonverbal indicators of pain at rest)    Social History:  Patient's brother present and provided below information as patient unable to provide.  Living Environment: Patient  lives in a group home . Resident since 2008.  Prior Level of Function: Prior to admission, patient was independent with bed mobility,  transfers, and functional mobility. He was able to dress himself, but required PRN A of staff for bathing. Brother reports new onset incontinence.   Equipment Used at Home: none  DME owned (not currently used): none  Assistance Upon Discharge: facility staff, unknown level of physical A to be provided via staff    Objective:     Communicated with nursePatrice, prior to session. Patient found supine with telemetry, peripheral IV, bed alarm (blanca sys) upon OT entry to room.    General Precautions: Standard, fall   Orthopedic Precautions:N/A   Braces: N/A  Respiratory Status: Room air    Bed Mobility:  Supine to sit from left side of bed with total assistance of 2 persons  Patient sat EOB x5 mins prior to transfer to bedside chair in attempts to improve sitting balance. Total A of 2 required to maintain static sitting balance with posterior active resistance.    Functional Mobility/Transfers:  Sit <> Stand Transfer with total assistance and 2 persons with no assistive device  Bed <> Chair Transfer using Stand Pivot technique with total assistance and 2 persons with no assistive device  After transfer to bedside chair therapist noted patient with nonverbal indicators of pain at feet. Doffed sock and noted redness and inflammation at B feet (specifically at toes). Nurse informed.    Activities of Daily Living:  Lower Body Dressing: total assistance donning socks while in bedside chair  Toileting: total assistance noted to be soiled upon entry, total A for donning clean brief and toileting hygiene.    Cognitive/Visual Perceptual:  Cognitive/Psychosocial Skills:     -       Oriented to: Person   -       Follows Commands/attention:inconsistently following 1 step commands    Physical Exam:  Balance:    -       sitting: absent, standing: absent  Upper Extremity Range of Motion:     -       Right Upper Extremity: WFL  -       Left Upper Extremity: WFL  Upper Extremity Strength:    -       Right Upper Extremity: Deficits:  functionally 4/5  -       Left Upper Extremity: Deficits: functionally 4/5   Strength:    -       Right Upper Extremity: WFL  -       Left Upper Extremity: WFL    AMPAC 6 Click ADL:  AMPAC Total Score: 6    Treatment & Education:  Therapist provided facilitation and instruction of proper body mechanics, energy conservation, and fall prevention strategies during tasks listed above.  Patient educated on role of OT, POC and goals for therapy  Patient educated on importance of OOB activities with staff member assistance and sitting OOB majority of the day.   Brother present and provided with information/education listed above as patient with limited comprehension.    Patient clear to stand pivot transfer with RN/PCT, assist x2 .    Patient left up in chair with all lines intact, call button in reach, RN notified, chair alarm on, and brother present.    GOALS:   Multidisciplinary Problems       Occupational Therapy Goals          Problem: Occupational Therapy    Goal Priority Disciplines Outcome Interventions   Occupational Therapy Goal     OT, PT/OT     Description: Goals to be met by: 1/2/23     Patient will increase functional independence with ADLs by performing:    Toileting from bedside commode with Maximum Assistance for hygiene and clothing management.   Toilet transfer to bedside commode with Maximum Assistance.  Increased functional strength in B UE grossly to WFL.                         History:     Past Medical History:   Diagnosis Date    Thyroid disease        No past surgical history on file.    Time Tracking:     OT Date of Treatment: 12/19/22  OT Start Time: 1005  OT Stop Time: 1030  OT Total Time (min): 25 min    Billable Minutes: Evaluation 15 and Therapeutic Activity 10    12/19/2022

## 2022-12-19 NOTE — PROGRESS NOTES
Received patient for care via stretcher from the ER, patient transferred to bed without difficulty - Full assessment completed, vital signs taken, monitor placed on patient and verified with monitor tech. Bed alarm on for patient safety.

## 2022-12-19 NOTE — PLAN OF CARE
P.T. EVAL COMPLETE.  PT CURRENTLY REQUIRES TOTAL ASSIST X 2 FOR BED MOBILITY AND TF'S.  P.T. RECOMMENDS SNF AT D/C

## 2022-12-19 NOTE — PROGRESS NOTES
Received report from ER nurse, informed her that the room was not finished being cleaned and that as soon as it was I would call her.

## 2022-12-19 NOTE — CONSULTS
PT/OT recs for SNF. Referral sent via [facility] (25 referrals total in [place]). SW unable to locate in-network SNF list with Medicaid of [place] payor.

## 2022-12-19 NOTE — PLAN OF CARE
Unable to discuss Plan of Care with patient  - Patient remains awake and alert, but nonverbal - remains free of falls, accidents and trauma during the day shift. Bed is in the low position, bed alarm is on and the call light is within reach. Will continue to monitor

## 2022-12-19 NOTE — PROGRESS NOTES
ST swallowing evaluation orders received and chart reviewed.  Pt currently NPO for CTA.  ST to return post-procedure for bedside swallow assessment.  Per nursing, pt consuming diet and medications without incident.

## 2022-12-19 NOTE — SUBJECTIVE & OBJECTIVE
Interval History: Now opening eyes spontaneously. CTA head and neck pending.   Review of Systems   Unable to perform ROS: Patient nonverbal this is baseline  Objective:     Vital Signs (Most Recent):  Temp: 98 °F (36.7 °C) (12/19/22 0828)  Pulse: 84 (12/19/22 0828)  Resp: 18 (12/19/22 0828)  BP: 116/68 (12/19/22 0828)  SpO2: 98 % (12/19/22 0828) Vital Signs (24h Range):  Temp:  [97.4 °F (36.3 °C)-98.5 °F (36.9 °C)] 98 °F (36.7 °C)  Pulse:  [] 84  Resp:  [18-21] 18  SpO2:  [96 %-100 %] 98 %  BP: (116-134)/(58-68) 116/68     Weight: 62.6 kg (138 lb)  Body mass index is 24.45 kg/m².  No intake or output data in the 24 hours ending 12/19/22 1024   Physical Exam  HENT:      Head: Normocephalic and atraumatic.   Cardiovascular:      Rate and Rhythm: Normal rate and regular rhythm.      Heart sounds: No murmur heard.  Pulmonary:      Effort: Pulmonary effort is normal. No respiratory distress.      Breath sounds: Normal breath sounds. No wheezing.   Abdominal:      General: Bowel sounds are normal. There is no distension.      Palpations: Abdomen is soft.      Tenderness: There is no abdominal tenderness.   Musculoskeletal:         General: No swelling.   Skin:     General: Skin is warm and dry.   Neurological:      Mental Status: He is alert.      Motor: Weakness present.       Significant Labs: All pertinent labs within the past 24 hours have been reviewed.    Significant Imaging: I have reviewed all pertinent imaging results/findings within the past 24 hours.

## 2022-12-19 NOTE — PLAN OF CARE
[facility] - Telemetry (Hospital)  Initial Discharge Assessment     Anticipated DC dispo: SNF placement   Prior Level of Function: Independent; no DME for ambulation  PCP:      Comments: SW spoke with group home manger, Mr. Acevedo and RN, Ana Laura, for assessment. Patient was previously independent and did not require DME for ambulation. Pt was able to dress self. Pt is not at his baseline functioning and cannot return to group home with his current level of functioning. SW explained that PT/OT rec for SNF. SW explained that SNF placement will be challenging d/t pt's disability and being non-verbal. Pt will likely require Level 2 pasrr for SNF placement. [facility] referrals sent to SNF's in [facility] d/t inabilit to locate in-network SNF list with pt's [place] pay[place].    Pt's whiteboard updated to reflect discharge disposition and SW contact information. SW to continue following.     Primary Care Provider: Primary Doctor No    Admission Diagnosis: Down's syndrome [Q90.9]  Weakness [R53.1]  Chest pain [R07.9]    Admission Date: 12/18/2022  Expected Discharge Date:     Discharge Barriers Identified: None    Payor: MEDICAID / Plan: MEDICAID [place] / Product Type: Government /     Extended Emergency Contact Information  Primary Emergency Contact: Epifanio Sarmiento  Mobile Phone: [phone]  Relation: Other  Secondary Emergency Contact: Maurizio Arndt  Mobile Phone: [phone]  Relation: Sister    Discharge Plan A: Skilled Nursing Facility  Discharge Plan B: Skilled Nursing Facility    No Pharmacies Listed    Initial Assessment (most recent)       Adult Discharge Assessment - 12/19/22 1440          Discharge Assessment    Assessment Type Discharge Planning Assessment     Confirmed/corrected address, phone number and insurance Yes     Confirmed Demographics Correct on Facesheet     Source of Information facility verbal report     Communicated CAMPBELL with patient/caregiver Date not available/Unable to determine     Reason  For Admission Acute weakness     People in Home facility resident     Facility Arrived From: Current Group Home - Manager: Mr Acevedo ([phone])     Do you expect to return to your current living situation? No   SNF recommendations    Prior to hospitilization cognitive status: Unable to Assess     Current cognitive status: Unable to Assess     Equipment Currently Used at Home none     Readmission within 30 days? No     Patient currently being followed by outpatient case management? No     Do you currently have service(s) that help you manage your care at home? No     Do you take prescription medications? Yes     Do you have prescription coverage? Yes     Do you have any problems affording any of your prescribed medications? No     Is the patient taking medications as prescribed? yes     Who is going to help you get home at discharge? TBD by hospital progress     How do you get to doctors appointments? agency     Are you on dialysis? No     Do you take coumadin? No     Discharge Plan A Skilled Nursing Facility     Discharge Plan B Skilled Nursing Facility     DME Needed Upon Discharge  other (see comments)     Discharge Barriers Identified None

## 2022-12-19 NOTE — PLAN OF CARE
OT mica completed. Sup>sit total A of 2, sit>stand total A of 2, step>pivot to bedside chair with total A of 2. Recommending SNF at d/c.

## 2022-12-19 NOTE — HOSPITAL COURSE
Admitted for change in mentation. Unable to lay still for MRI. CTH showed no acute abnormality. Will obtain CTA head and neck negative but did show microvascular changes. Case discussed with Dr Morris and we collectively concluded that patient may have had a small CVA due to deficits he is exhibiting but unable to confirm as we are unable to obtain an MRI. Plan to initiate DAPT for 30 days followed by ASA only thereafter.  Echo essentialy WNL. SLP rec crushed meds and dysphagia diet. Toe inflammation resolved, with prednisone, given for 5 days then stopped.   Patient markedly improved and smiling, able to communicate with simple words and gestures, able to follow simple commands. Sitting in bed, activities being provided, able to feed self. Continue working with PT OT. Notified Level II approved, [facility] accepted, auth received. COVID test: negative. Plan for discharge to SNF today. Patient seen and examined on day of discharge and determined stable for discharge.

## 2022-12-19 NOTE — PROGRESS NOTES
[facility] Telemetry ([facility] Medicine  Progress Note    Patient Name: Albaro Langley  MRN: [id number]  Patient Class: OP- Observation   Admission Date: 12/18/2022  Length of Stay: 0 days  Attending Physician: Leslie Morris MD  Primary Care Provider: Primary Doctor No        Subjective:     Principal Problem:Acute weakness        HPI:  The patient is 53 yo male with past medical history of Down's syndrome and hypothyroidism who presented to the ED with acute onset of weakness. He was last seen normal around 1900. His caregiver woke him of around 0530 this morning and told him get dressed. The patient fell and was unable to dress himself. He also seemed confused per the caregiver. He was transported to ED via EMS as caregiver concerned patient may have had a stroke given his weakness and leaning to the left. His brother and sister were at bedside. Patient recently started soiling his pants. CT head no acute findings. Patient  unable to complete MRI. Hospital medicine consulted. Discussed additional labs and repeat head CT in am. Patient placed in observation.      Overview/Hospital Course:  Admitted for change in mentation. Unable to lay still for MRI. CTH showed no acute abnormality. Will obtain CTA head and neck to determine presence of stroke. Echo ordered and results pending. Patient not following commands but opens eyes spontaneously and moves all extm spontaneously as well. Toes in lower extm slightly inflamed and cool to touch will obtain arterial US. Will need higher level of care with possibly SNF placement. CM consulted      Interval History: Now opening eyes spontaneously. CTA head and neck pending.   Review of Systems   Unable to perform ROS: Patient nonverbal this is baseline  Objective:     Vital Signs (Most Recent):  Temp: 98 °F (36.7 °C) (12/19/22 0828)  Pulse: 84 (12/19/22 0828)  Resp: 18 (12/19/22 0828)  BP: 116/68 (12/19/22 0828)  SpO2: 98 % (12/19/22 0828) Vital Signs (24h  Range):  Temp:  [97.4 °F (36.3 °C)-98.5 °F (36.9 °C)] 98 °F (36.7 °C)  Pulse:  [] 84  Resp:  [18-21] 18  SpO2:  [96 %-100 %] 98 %  BP: (116-134)/(58-68) 116/68     Weight: 62.6 kg (138 lb)  Body mass index is 24.45 kg/m².  No intake or output data in the 24 hours ending 12/19/22 1024   Physical Exam  HENT:      Head: Normocephalic and atraumatic.   Cardiovascular:      Rate and Rhythm: Normal rate and regular rhythm.      Heart sounds: No murmur heard.  Pulmonary:      Effort: Pulmonary effort is normal. No respiratory distress.      Breath sounds: Normal breath sounds. No wheezing.   Abdominal:      General: Bowel sounds are normal. There is no distension.      Palpations: Abdomen is soft.      Tenderness: There is no abdominal tenderness.   Musculoskeletal:         General: No swelling.   Skin:     General: Skin is warm and dry.   Neurological:      Mental Status: He is alert.      Motor: Weakness present.       Significant Labs: All pertinent labs within the past 24 hours have been reviewed.    Significant Imaging: I have reviewed all pertinent imaging results/findings within the past 24 hours.      Assessment/Plan:      * Acute weakness  CT head no acute findings  MRI non-diagnostic due to movement  Obtain chest and pelvic x-ray  Labs unrevealing as well  Repeat head CT in am  PT/OT evaluation as unable to  ED    12/19:  Will obtain CTA head and neck to determine presence of stroke   Echo ordered and results pending   Will need higher level of care with possibly SNF placement PT OT eval and treat.   CM consulted    Down syndrome  Usually ambulates without assistance at group home  May need placement if unable to ambulate   Assist with meals      Hypothyroid  Continue levothyroxine TSH elevated but free T4 within normal limits      VTE Risk Mitigation (From admission, onward)         Ordered     IP VTE LOW RISK PATIENT  Once         12/18/22 1653     Place sequential compression device  Until  discontinued         12/18/22 1653                Discharge Planning   CAMPBELL:      Code Status: Full Code   Is the patient medically ready for discharge?:     Reason for patient still in hospital (select all that apply): Imaging                     Rosario Nichols NP  Department of Hospital Medicine   [facility] ([facility])

## 2022-12-19 NOTE — PT/OT/SLP EVAL
Physical Therapy Evaluation    Patient Name:  Albaro Langley   MRN:  [id number]    Recommendations:     Discharge Recommendations: nursing facility, skilled   Discharge Equipment Recommendations:  (TBD AT NEXT LEVEL OF CARE)   Barriers to discharge: None    Assessment:     Albaro Langley is a 52 y.o. male admitted with a medical diagnosis of Acute weakness.  He presents with the following impairments/functional limitations: weakness, impaired endurance, impaired self care skills, impaired functional mobility, gait instability, impaired balance, pain, decreased safety awareness, decreased lower extremity function, decreased coordination.    Rehab Prognosis: Fair; patient would benefit from acute skilled PT services to address these deficits and reach maximum level of function.    Recent Surgery: * No surgery found *     Plan:     During this hospitalization, patient to be seen 3 x/week to address the identified rehab impairments via gait training, therapeutic activities, therapeutic exercises and progress toward the following goals:    Plan of Care Expires:  01/02/23    Subjective     Chief Complaint:   Patient/Family Comments/goals:   Pain/Comfort:  Pain Rating 1:  (PT UNABLE TO VERBALIZE PAIN NUMBER OR LOCATION HOWEVER PT VERY RESISTIVE TO MOVEMENT DUE TO RED/INFLAMED FEET-NOTIFIED NURSE)  Location - Side 1: Bilateral  Location - Orientation 1: generalized  Location 1: foot  Pain Addressed 1: Reposition, Distraction, Cessation of Activity, Nurse notified    Patients cultural, spiritual, Syn[employer] conflicts given the current situation:      Living Environment:  BROTHER PRESENT TO GIVE PT'S PLOF/HISTORY:  PT IS A RESIDENT OF [facility], PTA PT WAS INDEP WITH BED MOBILITY, TF'S, AND GAIT, ABLE TO DRESS HIMSELF BUT REQUIRED LITTLE STAFF ASSIST FOR BATHING, BROTHER REPORTS PT HAS RECENTLY BECOME INCONTINENT  Prior to admission, patients level of function was.  Equipment used at home: none.  DME owned (not currently  used): none.  Upon discharge, patient will have assistance from STAFF AT GROUP HOME.    Objective:     Communicated with NURSE MCCORD prior to session.  Patient found supine with telemetry, peripheral IV, bed alarm  upon PT entry to room.    General Precautions: Standard, fall (H/O DOWN'S SYNDROME)  Orthopedic Precautions:N/A   Braces: N/A  Respiratory Status: Room air    Exams:  Cognitive Exam:  Patient is oriented to Person and PT UNABLE TO ANSWER QUESTIONS, ABLE TO FOLLOW VERY MINIMAL SIMPLE COMMANDS  Postural Exam:  Patient presented with the following abnormalities:    -       Rounded shoulders  -       STRONG POSTERIOR LEAN IN SITTING AND STANDING  Sensation: UNABLE TO ASSESS  RLE ROM: WFL except AAROM TO COMPLETE FULL ROM  RLE Strength: UNABLE TO ASSESS FULLY DUE TO PT UNABLE TO FOLLOW COMMANDS  LLE ROM: WFL except AAROM TO COMPLETE FULL ROM  LLE Strength: UNABLE TO ASSESS FULLY DUE TO PT UNABLE TO FOLLOW COMMANDS     Functional Mobility:  Bed Mobility:     Rolling Left:  total assistance and of 2 persons  Scooting: total assistance and of 2 persons  Supine to Sit: total assistance and of 2 persons  Transfers:     Sit to Stand:  total assistance and of 2 persons with no AD  Bed to Chair: total assistance and of 2 persons with  no AD  using  Squat Pivot  Balance: POOR SITTING AND STANDING BALANCE DUE TO STRONG POSTERIOR LEAN, PT APPEARS TO BE RESISTIVE TO MOVEMENT DUE TO PAINFUL TOES TO B FEET    AM-PAC 6 CLICK MOBILITY  Total Score:6     Treatment & Education:  PT AND BROTHER EDUCATED IN ROLE OF P.T. AND POC IN ACUTE CARE HOSPITAL SETTING, ENCOURAGED TO INCREASE TIME OOB IN CHAIR TO TOLERANCE, EDUCATED IN AND ENCOURAGED TO PERFORM BLE THEREX WHILE SEATED OR SUPINE THROUGHOUT THE DAY TO TOLERANCE: HIP FLEX/EXT, QUAD SET, LAQ, HEEL SLIDES, AP'S.    PT INCONTINENT OF URINE TO BRIEF, GOWN, AND SHEETS.  PT REQUIRED TOTAL ASSIST FOR CLEANING AND CHANGING SOILED BRIEF AND GOWN  PT EDUCATED ON RISK FOR FALLS DUE TO  "GENERALIZED WEAKNESS, EDUCATED ON "CALL DON'T FALL", ENCOURAGED TO CALL FOR ASSISTANCE WITH ALL NEEDS SUCH AS BED<>CHAIR TRANSFERS OR TRIPS TO BATHROOM, PT AGREEABLE TO SAFETY PRECAUTIONS    Patient left up in chair with all lines intact, call button in reach, chair alarm on, NURSE notified, and BROTHER present.    GOALS:   Multidisciplinary Problems       Physical Therapy Goals          Problem: Physical Therapy    Goal Priority Disciplines Outcome Goal Variances Interventions   Physical Therapy Goal     PT, PT/OT      Description: LTG'S TO BE MET IN 14 DAYS (1-2-23)  PT WILL REQUIRE MODA FOR BED MOBILITY  PT WILL REQUIRE MODA FOR BED<>CHAIR TF'S  PT WILL  FEET WITH OR W/O RW AND MODA                         History:     Past Medical History:   Diagnosis Date    Thyroid disease        No past surgical history on file.    Time Tracking:     PT Received On: 12/19/22  PT Start Time: 0945     PT Stop Time: 1015  PT Total Time (min): 30 min     Billable Minutes: Evaluation 15 and Therapeutic Activity 15    12/19/2022  "

## 2022-12-19 NOTE — ASSESSMENT & PLAN NOTE
CT head no acute findings  MRI non-diagnostic due to movement  Obtain chest and pelvic x-ray  Labs unrevealing as well  Repeat head CT in am  PT/OT evaluation as unable to  ED    12/19:  Will obtain CTA head and neck to determine presence of stroke   Echo ordered and results pending   Will need higher level of care with possibly SNF placement PT OT eval and treat.   CM consulted

## 2022-12-20 LAB
ESTIMATED AVG GLUCOSE: 97 MG/DL (ref 68–131)
HBA1C MFR BLD: 5 % (ref 4–5.6)

## 2022-12-20 PROCEDURE — 25000003 PHARM REV CODE 250: Performed by: NURSE PRACTITIONER

## 2022-12-20 PROCEDURE — 97535 SELF CARE MNGMENT TRAINING: CPT

## 2022-12-20 PROCEDURE — 92610 EVALUATE SWALLOWING FUNCTION: CPT

## 2022-12-20 PROCEDURE — 83036 HEMOGLOBIN GLYCOSYLATED A1C: CPT | Performed by: NURSE PRACTITIONER

## 2022-12-20 PROCEDURE — 36415 COLL VENOUS BLD VENIPUNCTURE: CPT | Performed by: NURSE PRACTITIONER

## 2022-12-20 PROCEDURE — G0378 HOSPITAL OBSERVATION PER HR: HCPCS

## 2022-12-20 RX ADMIN — CLOPIDOGREL BISULFATE 75 MG: 75 TABLET ORAL at 09:12

## 2022-12-20 RX ADMIN — LEVOTHYROXINE SODIUM 88 MCG: 50 TABLET ORAL at 06:12

## 2022-12-20 RX ADMIN — ASPIRIN 81 MG: 81 TABLET, COATED ORAL at 09:12

## 2022-12-20 NOTE — PT/OT/SLP EVAL
Speech Language Pathology Evaluation  Bedside Swallow    Patient Name:  Albaro Langley   MRN:  [id number]  Admitting Diagnosis: Acute weakness    Recommendations:                 General Recommendations:  Dysphagia therapy, diet follow up  Diet recommendations:  Soft & Bite Sized Diet - IDDSI Level 6, Thin liquids - IDDSI Level 0   Aspiration Precautions: Assistance with meals, Eliminate distractions, Feed only when awake/alert, Frequent oral care, HOB to 90 degrees, Meds crushed in puree, Monitor for s/s of aspiration, Remain upright 30 minutes post meal, Small bites/sips, and Standard aspiration precautions   General Precautions: Standard, aspiration  Communication strategies:  yes/no questions only and provide increased time to answer    History:     Past Medical History:   Diagnosis Date    Thyroid disease        No past surgical history on file.    Social History: Patient lives in Group Home and receives assist s/t congenital cognitive impairment.    Prior Intubation HX:  N/A    Modified Barium Swallow: N/A    Chest X-Rays: Clear lungs.    Prior diet: Pt consumes a regular diet.    Subjective     Pt seen bedside for ST--sitting upright in bed.  Awake.  Breakfast tray in front of him.  No c/o pain.  No family present.  Patient goals: to eat/drink    Pain/Comfort:  Pain Rating 1: 0/10  Pain Rating Post-Intervention 1: 0/10  Pain Rating 2: 0/10  Pain Rating Post-Intervention 2: 0/10    Respiratory Status: Room air    Objective:     Oral Musculature Evaluation  Oral Musculature: general weakness  Dentition: scattered dentition, teeth in poor condition  Secretion Management: adequate  Mucosal Quality: good  Mandibular Strength and Mobility: impaired  Oral Labial Strength and Mobility: impaired coordination  Lingual Strength and Mobility: impaired strength (lingual protrusion noted)  Velar Elevation: WFL  Buccal Strength and Mobility: WFL  Volitional Cough: present  Volitional Swallow: present  Voice Prior to PO  Intake: WFL    Bedside Swallow Eval:   Consistencies Assessed:  Pt consumed breakfast during session--regular diet/thin liquids.  ST chopped solids for pt and assisted with [place] cup holding.  Pt able to hold utensil with RUE to self-feed.     Oral Phase:   Slow oral transit time  Lingual protrusion present during mastication/deglutition    Pharyngeal Phase:   no overt clinical signs/symptoms of aspiration  Delayed swallow    Compensatory Strategies  Upright at 90 degrees  Slowed feeding rate  IDDSI 6/IDDSI 0    Treatment: Pt recommended for IDDSI 6 (soft/bite sized solids) with IDDSI 0 (thin liquids), following aspiration precautions and meal assist/supervision. ST to f/u diet tolerance.    Assessment:     Albaro Langley is a 52 y.o. male with an SLP diagnosis of Dysphagia with reduced OM efficiency and recommended for IDDSI 6 (soft/bite sized solids) with IDDSI 0 (thin liquids), following aspiration precautions & meal assist.  ST to f/u diet tolerance.    Goals:   Multidisciplinary Problems       SLP Goals          Problem: SLP    Goal Priority Disciplines Outcome   SLP Goal     SLP    Description: 1.  Pt will consume IDDSI 6 (soft/bite sized solids) with IDDSI 0 (thin liquids) without incident.                       Plan:     Patient to be seen:  2 x/week, 3 x/week   Plan of Care expires:  12/27/22  Plan of Care reviewed with:  patient   SLP Follow-Up:  Yes       Discharge recommendations:  nursing facility, skilled   Barriers to Discharge:  None    Time Tracking:     SLP Treatment Date:   12/20/22  Speech Start Time:  1000  Speech Stop Time:  1030     Speech Total Time (min):  30 min    Billable Minutes: Eval Swallow and Oral Function 15 minutes and Self Care/Home Management Training 15 minutes    12/20/2022

## 2022-12-20 NOTE — PROGRESS NOTES
[facility] Medicine  Progress Note    Patient Name: Albaro Langley  MRN: [id number]  Patient Class: OP- Observation   Admission Date: 12/18/2022  Length of Stay: 0 days  Attending Physician: Leslie Morris MD  Primary Care Provider: Primary Doctor No        Subjective:     Principal Problem:Acute weakness        HPI:  The patient is 51 yo male with past medical history of Down's syndrome and hypothyroidism who presented to the ED with acute onset of weakness. He was last seen normal around 1900. His caregiver woke him of around 0530 this morning and told him get dressed. The patient fell and was unable to dress himself. He also seemed confused per the caregiver. He was transported to ED via EMS as caregiver concerned patient may have had a stroke given his weakness and leaning to the left. His brother and sister were at bedside. Patient recently started soiling his pants. CT head no acute findings. Patient  unable to complete MRI. Hospital medicine consulted. Discussed additional labs and repeat head CT in am. Patient placed in observation.      Overview/Hospital Course:  Admitted for change in mentation. Unable to lay still for MRI. CTH showed no acute abnormality. Will obtain CTA head and neck negative but did show microvascular changes. Case discussed with Dr Morris and we collectively concluded that patient may have had a small CVA due to deficits he is exhibiting but unable to confirm as we are unable to obtain an MRI. Plan to initiate DAPT for 30 days followed by ASA only thereafter.  Echo essentialy WNL. SLP rec crushed meds and dysphagia diet. Placement will be difficult in nature due to Medicaid and disability.             Interval History: Initiated on DAPT. On dysphagia diet now    Review of Systems   Unable to perform ROS: Patient nonverbal   Objective:     Vital Signs (Most Recent):  Temp: 98.8 °F (37.1 °C) (12/20/22 1220)  Pulse: 100 (12/20/22 1220)  Resp: 17  (12/20/22 1220)  BP: 133/66 (12/20/22 1220)  SpO2: 97 % (12/20/22 1220) Vital Signs (24h Range):  Temp:  [97.8 °F (36.6 °C)-99 °F (37.2 °C)] 98.8 °F (37.1 °C)  Pulse:  [] 100  Resp:  [17-18] 17  SpO2:  [94 %-100 %] 97 %  BP: ()/(56-71) 133/66     Weight: 62.2 kg (137 lb 2 oz)  Body mass index is 24.29 kg/m².  No intake or output data in the 24 hours ending 12/20/22 1228   Physical Exam  HENT:      Head: Normocephalic and atraumatic.   Cardiovascular:      Rate and Rhythm: Normal rate and regular rhythm.      Heart sounds: No murmur heard.  Pulmonary:      Effort: Pulmonary effort is normal. No respiratory distress.      Breath sounds: Normal breath sounds. No wheezing.   Abdominal:      General: Bowel sounds are normal. There is no distension.      Palpations: Abdomen is soft.      Tenderness: There is no abdominal tenderness.   Musculoskeletal:         General: No swelling.   Skin:     General: Skin is warm and dry.   Neurological:      Mental Status: He is alert.      Motor: Weakness present.      Comments: Able to feed himself with RUE       Significant Labs: All pertinent labs within the past 24 hours have been reviewed.    Significant Imaging: I have reviewed all pertinent imaging results/findings within the past 24 hours.      Assessment/Plan:      * Acute weakness  CT head no acute findings  MRI non-diagnostic due to movement  Obtain chest and pelvic x-ray  Labs unrevealing as well  Repeat head CT in am  PT/OT evaluation as unable to  ED    12/19:  Will obtain CTA head and neck to determine presence of stroke   Echo ordered and results pending   Will need higher level of care with possibly SNF placement PT OT eval and treat.   CM consulted    12/20:  CTH showed no acute abnormality   Will obtain CTA head and neck negative but did show microvascular changes   Case discussed with Dr Morris and we collectively concluded that patient may have had a small CVA due to deficits he is exhibiting  but unable to confirm as we are unable to obtain an MRI   DAPT for 30 days followed by ASA only thereafter    Echo essentialy WNL   SLP rec crushed meds and dysphagia diet  Placement will be difficult in nature due to Medicaid and disability        Down syndrome  Usually ambulates without assistance at group home  May need placement if unable to ambulate   Assist with meals      Hypothyroid  Continue levothyroxine TSH elevated but free T4 within normal limits        VTE Risk Mitigation (From admission, onward)         Ordered     IP VTE LOW RISK PATIENT  Once         12/18/22 1653     Place sequential compression device  Until discontinued         12/18/22 1653                Discharge Planning   CAMPBELL:      Code Status: Full Code   Is the patient medically ready for discharge?:     Reason for patient still in hospital (select all that apply): Patient trending condition and Pending disposition  Discharge Plan A: Skilled Nursing Facility                  Rosario Nichols NP  Department of Hospital Medicine   [facility] - Telemetry ([facility])

## 2022-12-20 NOTE — PT/OT/SLP PROGRESS
Physical Therapy      Patient Name:  Albaro Langley   MRN:  [id number]    ATTEMPTED P.T. TX. THIS AM, PT CURRENTLY GETTING LAB DRAW IN ROOM, WILL ASSESS PT NEXT VISIT, CONT PER POC      Elda Mcdaniels, PT  12/20/2022  1144

## 2022-12-20 NOTE — PLAN OF CARE
Pt alert; SHARATH orientation- pts speech remains slurred/mumbled. NSR-ST on the heart monitor. On room air; tolerating well. Pt incontinent; brief remains in place. Pt turned and repositioned with use of pillows. 20 G PIV in right AC remains CDI with no redness, swelling, warmth, or drainage saline locked. Bed low, wheels locked, alarms audible. Plan of care reviewed. Vital signs monitored. Fall precautions in place. Pain assessed. Safety promoted. Infection risks reduced.

## 2022-12-20 NOTE — PT/OT/SLP PROGRESS
Occupational Therapy      Patient Name:  Albaro Langley   MRN:  [id number]    Patient not seen today secondary to attempted x2 trials and patient with other staff. Will continue efforts.    Ana Laura Bui, OT    12/20/2022

## 2022-12-20 NOTE — PLAN OF CARE
"Multiple SNF denials d/t "inability to meet patient's needs, out of network, insufficient funding." MARIA LUZ discussed pt's referral with Gris, central  with [facility]. Facility ran pt's benefits and it was determined that with Medicaid of [place] payor source, pt does not have SNF benefit. Gris states that facility could consider accepting pt as a resident with Long Term Medicaid insurance if group home agreeable to plan.  MARIA LUZ discussed this information with Mr. Acevedo, Current . Mr. Acevedo states that pt has [employer] insurance. SW requested insurance information to assist with SNF placement process. Mr. Acevedo would like pt to return to their facility once SNF completed.     SW to continue following.   "

## 2022-12-20 NOTE — SUBJECTIVE & OBJECTIVE
Interval History: Initiated on DAPT. On dysphagia diet now    Review of Systems   Unable to perform ROS: Patient nonverbal   Objective:     Vital Signs (Most Recent):  Temp: 98.8 °F (37.1 °C) (12/20/22 1220)  Pulse: 100 (12/20/22 1220)  Resp: 17 (12/20/22 1220)  BP: 133/66 (12/20/22 1220)  SpO2: 97 % (12/20/22 1220) Vital Signs (24h Range):  Temp:  [97.8 °F (36.6 °C)-99 °F (37.2 °C)] 98.8 °F (37.1 °C)  Pulse:  [] 100  Resp:  [17-18] 17  SpO2:  [94 %-100 %] 97 %  BP: ()/(56-71) 133/66     Weight: 62.2 kg (137 lb 2 oz)  Body mass index is 24.29 kg/m².  No intake or output data in the 24 hours ending 12/20/22 1228   Physical Exam  HENT:      Head: Normocephalic and atraumatic.   Cardiovascular:      Rate and Rhythm: Normal rate and regular rhythm.      Heart sounds: No murmur heard.  Pulmonary:      Effort: Pulmonary effort is normal. No respiratory distress.      Breath sounds: Normal breath sounds. No wheezing.   Abdominal:      General: Bowel sounds are normal. There is no distension.      Palpations: Abdomen is soft.      Tenderness: There is no abdominal tenderness.   Musculoskeletal:         General: No swelling.   Skin:     General: Skin is warm and dry.   Neurological:      Mental Status: He is alert.      Motor: Weakness present.      Comments: Able to feed himself with RUE       Significant Labs: All pertinent labs within the past 24 hours have been reviewed.    Significant Imaging: I have reviewed all pertinent imaging results/findings within the past 24 hours.

## 2022-12-20 NOTE — ASSESSMENT & PLAN NOTE
CT head no acute findings  MRI non-diagnostic due to movement  Obtain chest and pelvic x-ray  Labs unrevealing as well  Repeat head CT in am  PT/OT evaluation as unable to  ED    12/19:  Will obtain CTA head and neck to determine presence of stroke   Echo ordered and results pending   Will need higher level of care with possibly SNF placement PT OT eval and treat.   CM consulted    12/20:  CTH showed no acute abnormality   Will obtain CTA head and neck negative but did show microvascular changes   Case discussed with Dr Morris and we collectively concluded that patient may have had a small CVA due to deficits he is exhibiting but unable to confirm as we are unable to obtain an MRI   DAPT for 30 days followed by ASA only thereafter    Echo essentialy WNL   SLP rec crushed meds and dysphagia diet  Placement will be difficult in nature due to Medicaid and disability

## 2022-12-20 NOTE — PLAN OF CARE
Pt alert; SHARATH orientation- pts speech is slurred/mumbled. NSR on the heart monitor. On room air; tolerating well. Pt incontinent; brief in place. Pt turned and repositioned with use of pillows. 20 G PIV in right AC CDI with no redness, swelling, warmth, or drainage saline locked. Bed low, wheels locked, alarms audible. Plan of care reviewed. Vital signs monitored. Fall precautions in place. Pain assessed. Safety promoted. Infection risks reduced.

## 2022-12-20 NOTE — PLAN OF CARE
SW met with pt's sister Maurizio at the bedside to provide update regarding placement status. SW explained that pt's placement will likely be challenging d/t pt's needs and Medicaid of [place] coverage.     SW informed by Josias with [facility] that they were able to locate [employer] Medicare insurance. [employer] insurance will assist in locating SNF placement. Pt's sister and  updated. Updated referral provided to in-network SNFs in [facility]; SW to f/u on status.     LOCET called in and PASRR faxed. SW anticipating that Level II review.

## 2022-12-21 LAB
ANION GAP SERPL CALC-SCNC: 9 MMOL/L (ref 8–16)
BASOPHILS # BLD AUTO: 0.04 K/UL (ref 0–0.2)
BASOPHILS NFR BLD: 0.8 % (ref 0–1.9)
BUN SERPL-MCNC: 22 MG/DL (ref 6–20)
CALCIUM SERPL-MCNC: 8.6 MG/DL (ref 8.7–10.5)
CHLORIDE SERPL-SCNC: 104 MMOL/L (ref 95–110)
CO2 SERPL-SCNC: 26 MMOL/L (ref 23–29)
CREAT SERPL-MCNC: 1.2 MG/DL (ref 0.5–1.4)
DIFFERENTIAL METHOD: ABNORMAL
EOSINOPHIL # BLD AUTO: 0 K/UL (ref 0–0.5)
EOSINOPHIL NFR BLD: 0.2 % (ref 0–8)
ERYTHROCYTE [DISTWIDTH] IN BLOOD BY AUTOMATED COUNT: 13.2 % (ref 11.5–14.5)
EST. GFR  (NO RACE VARIABLE): >60 ML/MIN/1.73 M^2
GLUCOSE SERPL-MCNC: 97 MG/DL (ref 70–110)
HCT VFR BLD AUTO: 46.7 % (ref 40–54)
HGB BLD-MCNC: 15.9 G/DL (ref 14–18)
IMM GRANULOCYTES # BLD AUTO: 0.03 K/UL (ref 0–0.04)
IMM GRANULOCYTES NFR BLD AUTO: 0.6 % (ref 0–0.5)
LYMPHOCYTES # BLD AUTO: 0.5 K/UL (ref 1–4.8)
LYMPHOCYTES NFR BLD: 8.9 % (ref 18–48)
MCH RBC QN AUTO: 32.3 PG (ref 27–31)
MCHC RBC AUTO-ENTMCNC: 34 G/DL (ref 32–36)
MCV RBC AUTO: 95 FL (ref 82–98)
MONOCYTES # BLD AUTO: 0.3 K/UL (ref 0.3–1)
MONOCYTES NFR BLD: 6.5 % (ref 4–15)
NEUTROPHILS # BLD AUTO: 4.2 K/UL (ref 1.8–7.7)
NEUTROPHILS NFR BLD: 83 % (ref 38–73)
NRBC BLD-RTO: 0 /100 WBC
PLATELET # BLD AUTO: 182 K/UL (ref 150–450)
PMV BLD AUTO: 10 FL (ref 9.2–12.9)
POTASSIUM SERPL-SCNC: 3.8 MMOL/L (ref 3.5–5.1)
RBC # BLD AUTO: 4.92 M/UL (ref 4.6–6.2)
SODIUM SERPL-SCNC: 139 MMOL/L (ref 136–145)
WBC # BLD AUTO: 5.04 K/UL (ref 3.9–12.7)

## 2022-12-21 PROCEDURE — 97530 THERAPEUTIC ACTIVITIES: CPT

## 2022-12-21 PROCEDURE — 85025 COMPLETE CBC W/AUTO DIFF WBC: CPT | Performed by: NURSE PRACTITIONER

## 2022-12-21 PROCEDURE — G0378 HOSPITAL OBSERVATION PER HR: HCPCS

## 2022-12-21 PROCEDURE — 97535 SELF CARE MNGMENT TRAINING: CPT

## 2022-12-21 PROCEDURE — 80048 BASIC METABOLIC PNL TOTAL CA: CPT | Performed by: NURSE PRACTITIONER

## 2022-12-21 PROCEDURE — 25000003 PHARM REV CODE 250: Performed by: NURSE PRACTITIONER

## 2022-12-21 PROCEDURE — 36415 COLL VENOUS BLD VENIPUNCTURE: CPT | Performed by: NURSE PRACTITIONER

## 2022-12-21 PROCEDURE — 97530 THERAPEUTIC ACTIVITIES: CPT | Mod: CQ

## 2022-12-21 PROCEDURE — 92526 ORAL FUNCTION THERAPY: CPT

## 2022-12-21 RX ORDER — GABAPENTIN 100 MG/1
100 CAPSULE ORAL 2 TIMES DAILY
Status: DISCONTINUED | OUTPATIENT
Start: 2022-12-21 | End: 2022-12-22

## 2022-12-21 RX ADMIN — GABAPENTIN 100 MG: 100 CAPSULE ORAL at 08:12

## 2022-12-21 RX ADMIN — GABAPENTIN 100 MG: 100 CAPSULE ORAL at 10:12

## 2022-12-21 RX ADMIN — ASPIRIN 81 MG: 81 TABLET, COATED ORAL at 09:12

## 2022-12-21 RX ADMIN — CLOPIDOGREL BISULFATE 75 MG: 75 TABLET ORAL at 09:12

## 2022-12-21 RX ADMIN — LEVOTHYROXINE SODIUM 88 MCG: 50 TABLET ORAL at 06:12

## 2022-12-21 NOTE — PLAN OF CARE
Remains with poor tolerance with standing due to severe pain in B feet with WB. Recommending SNF at d/c.

## 2022-12-21 NOTE — SUBJECTIVE & OBJECTIVE
Interval History: alert and sitting up eating breakfast.     Review of Systems   Unable to perform ROS: Patient nonverbal   Objective:     Vital Signs (Most Recent):  Temp: 98.5 °F (36.9 °C) (12/21/22 1159)  Pulse: 80 (12/21/22 1159)  Resp: 18 (12/21/22 1159)  BP: 110/73 (12/21/22 1159)  SpO2: 97 % (12/21/22 1159) Vital Signs (24h Range):  Temp:  [97.5 °F (36.4 °C)-100 °F (37.8 °C)] 98.5 °F (36.9 °C)  Pulse:  [] 80  Resp:  [17-18] 18  SpO2:  [93 %-97 %] 97 %  BP: ()/(56-75) 110/73     Weight: 60.1 kg (132 lb 7.9 oz)  Body mass index is 23.47 kg/m².  No intake or output data in the 24 hours ending 12/21/22 1422   Physical Exam  HENT:      Head: Normocephalic and atraumatic.   Cardiovascular:      Rate and Rhythm: Normal rate and regular rhythm.      Heart sounds: No murmur heard.  Pulmonary:      Effort: Pulmonary effort is normal. No respiratory distress.      Breath sounds: Normal breath sounds. No wheezing.   Abdominal:      General: Bowel sounds are normal. There is no distension.      Palpations: Abdomen is soft.      Tenderness: There is no abdominal tenderness.   Musculoskeletal:         General: No swelling.   Skin:     General: Skin is warm and dry.   Neurological:      Mental Status: He is alert.      Motor: Weakness present.      Comments: Able to feed himself with RUE       Significant Labs: All pertinent labs within the past 24 hours have been reviewed.    Significant Imaging: I have reviewed all pertinent imaging results/findings within the past 24 hours.

## 2022-12-21 NOTE — PT/OT/SLP PROGRESS
Physical Therapy  Treatment    Albaro Langley   MRN: [id number]   Admitting Diagnosis: Acute weakness    PT Received On: 12/21/22  PT Start Time: 0915     PT Stop Time: 0945    PT Total Time (min): 30 min       Billable Minutes:  Therapeutic Activity 30    Treatment Type: Treatment  PT/PTA: PTA     PTA Visit Number: 1       General Precautions: Standard, fall  Orthopedic Precautions: N/A  Braces: N/A  Respiratory Status: Room air         Subjective:  Communicated with patient's nurse and completed Epic chart review prior to session.  Patient without complaints and agreeable to PT session. (Did not resist efforts to initiate mobility)    Pain/Comfort  Pain Rating 1:  (C/O PAIN IN B FEET WHEN IN WEIGHTBEARING BUT COULD NOT RATE)  Pain Rating Post-Intervention 1: 0/10    Objective:   Patient found with: telemetry, peripheral IV, bed alarm, Other (comments) (AVASYS)    Supine > sit EOB: Mod A of 2 (increased time to complete)    Forward scoot towards EOB: Max A of 2 (increased time to complete)    STS from EOB w/ HHAx2 No AD: Max A of 2 (increased time to complete)    Stand pivot T/F from EOB > chair w/ HHAx2: Max A of 2   (encouraged patient to complete T/F as he normally would and was closely monitored and assisted as needed; patient completed a 180 degree turn to complete and required significant physical assist during final 10% of T/F in order to finish execution of turn; increased time to complete)     Educated patient on importance of increased tolerance to upright position and direct impact on CV endurance and strength. Patient encouraged to sit up in chair for a minimum of 2 consecutive hours per day. Encouraged patient to perform AROM TE to BLE throughout the day within all available planes of motion. Re enforced importance of utilizing call light to meet needs in room and not attempt to get up without staff assistance. Unsure of patient's level of understanding due to current cognitive status and Downs Syndrome  diagnosis.       AM-PAC 6 CLICK MOBILITY  How much help from another person does this patient currently need?   1 = Unable, Total/Dependent Assistance  2 = A lot, Maximum/Moderate Assistance  3 = A little, Minimum/Contact Guard/Supervision  4 = None, Modified [place]/Independent    Turning over in bed (including adjusting bedclothes, sheets and blankets)?: 2  Sitting down on and standing up from a chair with arms (e.g., wheelchair, bedside commode, etc.): 2  Moving from lying on back to sitting on the side of the bed?: 2  Moving to and from a bed to a chair (including a wheelchair)?: 2  Need to walk in hospital room?: 1  Climbing 3-5 steps with a railing?: 1  Basic Mobility Total Score: 10    AM-PAC Raw Score CMS G-Code Modifier Level of Impairment Assistance   6 % Total / Unable   7 - 9 CM 80 - 100% Maximal Assist   10 - 14 CL 60 - 80% Moderate Assist   15 - 19 CK 40 - 60% Moderate Assist   20 - 22 CJ 20 - 40% Minimal Assist   23 CI 1-20% SBA / CGA   24 CH 0% Independent/ Mod I     Patient left up in chair with call button in reach, chair alarm on, nurse notified, and AVASYS present.    Assessment:  Albaro Langley is a 52 y.o. male with a medical diagnosis of Acute weakness and presents with overall decline in functional mobility. Patient would continue to benefit from skilled PT to address functional limitations listed below in order to return to PLOF/decrease caregiver burden.    Rehab identified problem list/impairments: weakness, impaired endurance, impaired self care skills, impaired functional mobility, impaired balance, pain, decreased safety awareness, impaired cognition, impaired cardiopulmonary response to activity, edema, impaired skin, impaired fine motor, impaired coordination, decreased ROM, decreased lower extremity function, decreased upper extremity function    Rehab potential is fair.    Activity tolerance: Fair    Discharge recommendations: nursing facility, skilled      Barriers to  discharge:      Equipment recommendations: other (see comments) (TBD BY  NEXT LEVEL OF CARE)     GOALS:   Multidisciplinary Problems       Physical Therapy Goals          Problem: Physical Therapy    Goal Priority Disciplines Outcome Goal Variances Interventions   Physical Therapy Goal     PT, PT/OT      Description: LTG'S TO BE MET IN 14 DAYS (1-2-23)  PT WILL REQUIRE MODA FOR BED MOBILITY  PT WILL REQUIRE MODA FOR BED<>CHAIR TF'S  PT WILL  FEET WITH OR W/O RW AND MODA                         PLAN:    Patient to be seen 3 x/week to address the above listed problems via gait training, therapeutic activities, therapeutic exercises  Plan of Care expires: 01/02/23  Plan of Care reviewed with: patient         12/21/2022

## 2022-12-21 NOTE — PLAN OF CARE
MARIA LUZ received Letter of Consideration from [facility] regarding secondary review for pt's PASRR. Office of [employer] of Developmental Disabilities ([employer]) will do a review on patient's case; review can take 7-10 days.   MARIA LUZ contacted [employer] to request to speak with someone regarding review process. MARIA LUZ received call back from Farhana with [employer] who states they'll try to expedite review, but review is done by [employer].     MARIA LUZ received email from [facility] with [employer] requesting hospital clinicals to justify NH placement. MARIA LUZ provided requested clinicals.    [facility] clinically [facility] and will follow patient until review is complete to submit authorization. MARIA LUZ to continue following.

## 2022-12-21 NOTE — ASSESSMENT & PLAN NOTE
CT head no acute findings  MRI non-diagnostic due to movement  Obtain chest and pelvic x-ray  Labs unrevealing as well  Repeat head CT in am  PT/OT evaluation as unable to  ED    12/19:  Will obtain CTA head and neck to determine presence of stroke   Echo ordered and results pending   Will need higher level of care with possibly SNF placement PT OT eval and treat.   CM consulted    12/20:  CTH showed no acute abnormality   Will obtain CTA head and neck negative but did show microvascular changes   Case discussed with Dr Morris and we collectively concluded that patient may have had a small CVA due to deficits he is exhibiting but unable to confirm as we are unable to obtain an MRI   DAPT for 30 days followed by ASA only thereafter    Echo essentialy WNL   SLP rec crushed meds and dysphagia diet  Placement will be difficult in nature due to Medicaid and disability    12/21:   Continue DAPT for 30 days   Continue PT OT  Pending placement

## 2022-12-21 NOTE — PT/OT/SLP PROGRESS
Speech Language Pathology Treatment    Patient Name:  Albaro Langley   MRN:  [id number]  Admitting Diagnosis: Acute weakness    Recommendations:                 General Recommendations:  Follow-up not indicated  Diet recommendations:  Soft & Bite Sized Diet - IDDSI Level 6, Liquid Diet Level: Thin liquids - IDDSI Level 0   Aspiration Precautions: Assistance with meals, Feed only when awake/alert, Frequent oral care, HOB to 90 degrees, Meds crushed in puree, Remain upright 30 minutes post meal, Small bites/sips, and Standard aspiration precautions   General Precautions: Standard, aspiration  Communication strategies:  yes/no questions only and provide increased time to answer    Subjective     Pt seen bedside for ST.  Sitting upright in chair upon arrival.  No c/o pain.  No family present.  Avasys present.  Patient goals: unable to state     Pain/Comfort:  Pain Rating 1: 0/10  Pain Rating Post-Intervention 1: 0/10  Pain Rating 2: 0/10  Pain Rating Post-Intervention 2: 0/10    Respiratory Status: Room air    Objective:     Has the patient been evaluated by SLP for swallowing?   Yes  Keep patient NPO? No   Current Respiratory Status: room air      Pt consuming IDDSI 6 (soft/bite sized solids) with IDDSI 0 (thin liquids) without incident.    Aspiration precautions and feeding assist recommended.   Assessment:     Albaro Langley is a 52 y.o. male with an SLP diagnosis of Dysphagia and recommended for continued IDDSI 6 (soft/bite sized solids) with IDDSI 0 (thin liquids), following aspiration/swallowing precautions and meal assist. Medications recommended to be crushed and placed within pureed texture. No further SLP intervention indicated at this time.  MD to reconsult if needed.    Goals:   Multidisciplinary Problems       SLP Goals       Not on file              Multidisciplinary Problems (Resolved)          Problem: SLP    Goal Priority Disciplines Outcome   SLP Goal   (Resolved)     SLP Met   Description: 1.  Pt will  consume IDDSI 6 (soft/bite sized solids) with IDDSI 0 (thin liquids) without incident.                       Plan:     Patient to be seen:  2 x/week, 3 x/week   Plan of Care expires:   (STGs met)  Plan of Care reviewed with:  patient   SLP Follow-Up:  No       Discharge recommendations:  nursing facility, skilled   Barriers to Discharge:  None    Time Tracking:     SLP Treatment Date:   12/21/22  Speech Start Time:  1145  Speech Stop Time:  1200     Speech Total Time (min):  15 min    Billable Minutes: Treatment Swallowing Dysfunction 15 minutes    12/21/2022

## 2022-12-21 NOTE — PLAN OF CARE
Pt alert; SHARATH orientation- pts speech remains slurred/mumbled. NSR-ST on the heart monitor. Remains on room air; tolerating well. Pt incontinent; brief remains in place. Pt turned and repositioned with use of pillows. 20 G PIV in right AC remains CDI with no redness, swelling, warmth, or drainage saline locked. Bed low, wheels locked, alarms audible. Plan of care reviewed. Vital signs monitored. Fall precautions in place. Pain assessed. Safety promoted. Infection risks reduced.

## 2022-12-21 NOTE — PT/OT/SLP PROGRESS
Occupational Therapy   Treatment    Name: Albaro Langley  MRN: [id number]  Admitting Diagnosis:  Acute weakness       Recommendations:     Discharge Recommendations: nursing facility, skilled  Discharge Equipment Recommendations:   (TBD at next level of care)  Barriers to discharge:  Decreased caregiver support    Assessment:     Albaro Langley is a 52 y.o. male with a medical diagnosis of Acute weakness.  He presents with the following performance deficits affecting function are weakness, impaired endurance, impaired self care skills, impaired functional mobility, impaired balance, pain, decreased safety awareness, decreased lower extremity function, impaired fine motor, impaired cardiopulmonary response to activity, edema, impaired cognition, decreased ROM.     Rehab Prognosis:  Fair; patient would benefit from acute skilled OT services to address these deficits and reach maximum level of function.       Plan:     Patient to be seen 2 x/week to address the above listed problems via self-care/home management, therapeutic activities, therapeutic exercises  Plan of Care Expires: 01/02/23  Plan of Care Reviewed with: patient    Subjective     Pain/Comfort:  Pain Rating 1:  (resistive and with nonverbal indicators of pain in standing)  Pain Addressed 1:  (activity pacing)    Objective:     Communicated with: NursePatrice, prior to session.  Patient found supine with telemetry, peripheral IV, bed alarm (blanca sys) upon OT entry to room.    General Precautions: Standard, fall    Orthopedic Precautions:N/A  Braces: N/A  Respiratory Status: Room air    Bed Mobility:    Patient completed Supine to Sit with moderate assistance and 2 persons with max encouragement and frequent cueing to continue to engage in activity  Seated scooting with max A     Functional Mobility/Transfers:  Patient completed Sit <> Stand Transfer with maximal assistance and of 2 persons  with  no assistive device   Patient self initiated 180* stand>pivot  with poor upright posturing to bedside chair requiring max A of 2 to complete.    Activities of Daily Living:  Feeding:  maximal assistance . Patient with extremely poor attention to task. Attempted to engage in self feeding with R hand, with limited success. Patient with excellent PO intake with A.    [facility] 6 Click ADL: 7    Treatment & Education:  Patient with increased engagement in supine>sit this date. Remains with extremely poor tolerance for WB through B feet. NP present upon arrival and therapist discussed barriers and shown redness and warm of B toes. Patient with slightly improved command following with mobility as therapist allowed for patient to guide mobility technique. Patient educated on need to call for A to transfer back to bed.    Patient left up in chair with all lines intact, call button in reach, chair alarm on, and nurse notified    GOALS:   Multidisciplinary Problems       Occupational Therapy Goals          Problem: Occupational Therapy    Goal Priority Disciplines Outcome Interventions   Occupational Therapy Goal     OT, PT/OT Ongoing, Progressing    Description: Goals to be met by: 1/2/23     Patient will increase functional independence with ADLs by performing:    Toileting from bedside commode with Maximum Assistance for hygiene and clothing management.   Toilet transfer to bedside commode with Maximum Assistance.  Increased functional strength in B UE grossly to WFL.                         Time Tracking:     OT Date of Treatment: 12/21/22  OT Start Time: 0920  OT Stop Time: 0945  OT Total Time (min): 25 min    Billable Minutes:Self Care/Home Management 15  Therapeutic Activity 10    12/21/2022

## 2022-12-21 NOTE — PROGRESS NOTES
[facility] Medicine  Progress Note    Patient Name: Albaro Langley  MRN: [id number]  Patient Class: OP- Observation   Admission Date: 12/18/2022  Length of Stay: 0 days  Attending Physician: Leslie Morris MD  Primary Care Provider: Primary Doctor No        Subjective:     Principal Problem:Acute weakness        HPI:  The patient is 51 yo male with past medical history of Down's syndrome and hypothyroidism who presented to the ED with acute onset of weakness. He was last seen normal around 1900. His caregiver woke him of around 0530 this morning and told him get dressed. The patient fell and was unable to dress himself. He also seemed confused per the caregiver. He was transported to ED via EMS as caregiver concerned patient may have had a stroke given his weakness and leaning to the left. His brother and sister were at bedside. Patient recently started soiling his pants. CT head no acute findings. Patient  unable to complete MRI. Hospital medicine consulted. Discussed additional labs and repeat head CT in am. Patient placed in observation.      Overview/Hospital Course:  Admitted for change in mentation. Unable to lay still for MRI. CTH showed no acute abnormality. Will obtain CTA head and neck negative but did show microvascular changes. Case discussed with Dr Morris and we collectively concluded that patient may have had a small CVA due to deficits he is exhibiting but unable to confirm as we are unable to obtain an MRI. Plan to initiate DAPT for 30 days followed by ASA only thereafter.  Echo essentialy WNL. SLP rec crushed meds and dysphagia diet. Placement will be difficult in nature due to Medicaid and disability. Gabapentin initiated for what appears to be neuropathic pain. Seen working with PT OT today            Interval History: alert and sitting up eating breakfast.     Review of Systems   Unable to perform ROS: Patient nonverbal   Objective:     Vital Signs (Most  Recent):  Temp: 98.5 °F (36.9 °C) (12/21/22 1159)  Pulse: 80 (12/21/22 1159)  Resp: 18 (12/21/22 1159)  BP: 110/73 (12/21/22 1159)  SpO2: 97 % (12/21/22 1159) Vital Signs (24h Range):  Temp:  [97.5 °F (36.4 °C)-100 °F (37.8 °C)] 98.5 °F (36.9 °C)  Pulse:  [] 80  Resp:  [17-18] 18  SpO2:  [93 %-97 %] 97 %  BP: ()/(56-75) 110/73     Weight: 60.1 kg (132 lb 7.9 oz)  Body mass index is 23.47 kg/m².  No intake or output data in the 24 hours ending 12/21/22 1422   Physical Exam  HENT:      Head: Normocephalic and atraumatic.   Cardiovascular:      Rate and Rhythm: Normal rate and regular rhythm.      Heart sounds: No murmur heard.  Pulmonary:      Effort: Pulmonary effort is normal. No respiratory distress.      Breath sounds: Normal breath sounds. No wheezing.   Abdominal:      General: Bowel sounds are normal. There is no distension.      Palpations: Abdomen is soft.      Tenderness: There is no abdominal tenderness.   Musculoskeletal:         General: No swelling.   Skin:     General: Skin is warm and dry.   Neurological:      Mental Status: He is alert.      Motor: Weakness present.      Comments: Able to feed himself with RUE       Significant Labs: All pertinent labs within the past 24 hours have been reviewed.    Significant Imaging: I have reviewed all pertinent imaging results/findings within the past 24 hours.      Assessment/Plan:      * Acute weakness  CT head no acute findings  MRI non-diagnostic due to movement  Obtain chest and pelvic x-ray  Labs unrevealing as well  Repeat head CT in am  PT/OT evaluation as unable to  ED    12/19:  Will obtain CTA head and neck to determine presence of stroke   Echo ordered and results pending   Will need higher level of care with possibly SNF placement PT OT eval and treat.   CM consulted    12/20:  CTH showed no acute abnormality   Will obtain CTA head and neck negative but did show microvascular changes   Case discussed with Dr Morris and we  collectively concluded that patient may have had a small CVA due to deficits he is exhibiting but unable to confirm as we are unable to obtain an MRI   DAPT for 30 days followed by ASA only thereafter    Echo essentialy WNL   SLP rec crushed meds and dysphagia diet  Placement will be difficult in nature due to Medicaid and disability    12/21:   Continue DAPT for 30 days   Continue PT OT  Pending placement           Down syndrome  Usually ambulates without assistance at group home  May need placement if unable to ambulate   Assist with meals      Hypothyroid  Continue levothyroxine TSH elevated but free T4 within normal limits        VTE Risk Mitigation (From admission, onward)         Ordered     IP VTE LOW RISK PATIENT  Once         12/18/22 1653     Place sequential compression device  Until discontinued         12/18/22 1653                Discharge Planning   CAMPBELL:      Code Status: Full Code   Is the patient medically ready for discharge?:     Reason for patient still in hospital (select all that apply): Pending disposition  Discharge Plan A: Skilled Nursing Facility                  Rosario Nichols NP  Department of Hospital Medicine   [facility] - Telemetry ([facility])

## 2022-12-21 NOTE — PROGRESS NOTES
[facility] provisionally accepting pt's referral in [facility]. MARIA LUZ contacted Rakel, admissions coordinator for [facility], to discuss referral. Rakel provided with pt's sister, Maurizio, contact information for further follow up. MARIA LUZ explained that 142 has not been received yet and will likely require Level II review.     SW to f/u.

## 2022-12-22 PROBLEM — L08.9 INFLAMMATION OF TOE: Status: ACTIVE | Noted: 2022-12-22

## 2022-12-22 PROCEDURE — 97530 THERAPEUTIC ACTIVITIES: CPT

## 2022-12-22 PROCEDURE — G0378 HOSPITAL OBSERVATION PER HR: HCPCS

## 2022-12-22 PROCEDURE — 63600175 PHARM REV CODE 636 W HCPCS: Performed by: NURSE PRACTITIONER

## 2022-12-22 PROCEDURE — 25000003 PHARM REV CODE 250: Performed by: NURSE PRACTITIONER

## 2022-12-22 RX ORDER — PREDNISONE 20 MG/1
20 TABLET ORAL ONCE
Status: COMPLETED | OUTPATIENT
Start: 2022-12-22 | End: 2022-12-22

## 2022-12-22 RX ORDER — GABAPENTIN 100 MG/1
100 CAPSULE ORAL 3 TIMES DAILY
Status: DISCONTINUED | OUTPATIENT
Start: 2022-12-22 | End: 2022-12-24

## 2022-12-22 RX ADMIN — GABAPENTIN 100 MG: 100 CAPSULE ORAL at 09:12

## 2022-12-22 RX ADMIN — LEVOTHYROXINE SODIUM 88 MCG: 50 TABLET ORAL at 06:12

## 2022-12-22 RX ADMIN — CLOPIDOGREL BISULFATE 75 MG: 75 TABLET ORAL at 09:12

## 2022-12-22 RX ADMIN — GABAPENTIN 100 MG: 100 CAPSULE ORAL at 08:12

## 2022-12-22 RX ADMIN — ASPIRIN 81 MG: 81 TABLET, COATED ORAL at 09:12

## 2022-12-22 RX ADMIN — GABAPENTIN 100 MG: 100 CAPSULE ORAL at 03:12

## 2022-12-22 RX ADMIN — PREDNISONE 20 MG: 20 TABLET ORAL at 03:12

## 2022-12-22 NOTE — PLAN OF CARE
SW received email from Tiffany with [employer] requesting information to help build case for decision for SNF placement. SW provided requested information.   Updated clinicals provided to [facility] in [facility].   SW to continue following.

## 2022-12-22 NOTE — PLAN OF CARE
FAIR TOLERANCE TO TX., SBA FOR BED MOBILITY, UNABLE TO COMPLETE TF TOWARD L SIDE DESPITE MAX ASSIST BUT PT ONLY REQUIRED CGA FOR TF TOWARD R SIDE

## 2022-12-22 NOTE — PROGRESS NOTES
[facility] Medicine  Progress Note    Patient Name: Albaro Langley  MRN: [id number]  Patient Class: OP- Observation   Admission Date: 12/18/2022  Length of Stay: 0 days  Attending Physician: Leslie Morris MD  Primary Care Provider: Primary Doctor No        Subjective:     Principal Problem:Acute weakness        HPI:  The patient is 51 yo male with past medical history of Down's syndrome and hypothyroidism who presented to the ED with acute onset of weakness. He was last seen normal around 1900. His caregiver woke him of around 0530 this morning and told him get dressed. The patient fell and was unable to dress himself. He also seemed confused per the caregiver. He was transported to ED via EMS as caregiver concerned patient may have had a stroke given his weakness and leaning to the left. His brother and sister were at bedside. Patient recently started soiling his pants. CT head no acute findings. Patient  unable to complete MRI. Hospital medicine consulted. Discussed additional labs and repeat head CT in am. Patient placed in observation.      Overview/Hospital Course:  Admitted for change in mentation. Unable to lay still for MRI. CTH showed no acute abnormality. Will obtain CTA head and neck negative but did show microvascular changes. Case discussed with Dr Morris and we collectively concluded that patient may have had a small CVA due to deficits he is exhibiting but unable to confirm as we are unable to obtain an MRI. Plan to initiate DAPT for 30 days followed by ASA only thereafter.  Echo essentialy WNL. SLP rec crushed meds and dysphagia diet. Placement will be difficult in nature due to Medicaid and disability. Gabapentin initiated for what appears to be neuropathic pain. Continue working with PT OT            Interval History: Bilateral toes remain inflamed x1 dose prednisone ordered    Review of Systems   Unable to perform ROS: Patient nonverbal   Objective:     Vital  Signs (Most Recent):  Temp: 97.7 °F (36.5 °C) (12/22/22 1151)  Pulse: 84 (12/22/22 1151)  Resp: 17 (12/22/22 1151)  BP: 116/71 (12/22/22 1151)  SpO2: 97 % (12/22/22 1151) Vital Signs (24h Range):  Temp:  [97.5 °F (36.4 °C)-97.9 °F (36.6 °C)] 97.7 °F (36.5 °C)  Pulse:  [75-94] 84  Resp:  [17-18] 17  SpO2:  [95 %-98 %] 97 %  BP: (102-116)/(54-71) 116/71     Weight: 64.1 kg (141 lb 5 oz)  Body mass index is 25.03 kg/m².  No intake or output data in the 24 hours ending 12/22/22 1408   Physical Exam  HENT:      Head: Normocephalic and atraumatic.   Cardiovascular:      Rate and Rhythm: Normal rate and regular rhythm.      Heart sounds: No murmur heard.  Pulmonary:      Effort: Pulmonary effort is normal. No respiratory distress.      Breath sounds: Normal breath sounds. No wheezing.   Abdominal:      General: Bowel sounds are normal. There is no distension.      Palpations: Abdomen is soft.      Tenderness: There is no abdominal tenderness.   Musculoskeletal:         General: No swelling.   Skin:     General: Skin is warm and dry.   Neurological:      Mental Status: He is alert.      Motor: Weakness present.      Comments: Able to feed himself with RUE       Significant Labs: All pertinent labs within the past 24 hours have been reviewed.    Significant Imaging: I have reviewed all pertinent imaging results/findings within the past 24 hours.      Assessment/Plan:      * Acute weakness  CT head no acute findings  MRI non-diagnostic due to movement  Obtain chest and pelvic x-ray  Labs unrevealing as well  Repeat head CT in am  PT/OT evaluation as unable to  ED    12/19:  Will obtain CTA head and neck to determine presence of stroke   Echo ordered and results pending   Will need higher level of care with possibly SNF placement PT OT eval and treat.   CM consulted    12/20:  CTH showed no acute abnormality   Will obtain CTA head and neck negative but did show microvascular changes   Case discussed with Dr Morris  and we collectively concluded that patient may have had a small CVA due to deficits he is exhibiting but unable to confirm as we are unable to obtain an MRI   DAPT for 30 days followed by ASA only thereafter    Echo essentialy WNL   SLP rec crushed meds and dysphagia diet  Placement will be difficult in nature due to Medicaid and disability    12/21:   Continue DAPT for 30 days   Continue PT OT  Pending placement           Inflammation of toe  Does not necessarily appear like gout  Inflammation of bilateral LE toes  x1 dose Prednisone ordered  Gabapentin initiated as well          Down syndrome  Usually ambulates without assistance at group home  May need placement if unable to ambulate   Assist with meals      Hypothyroid  Continue levothyroxine TSH elevated but free T4 within normal limits        VTE Risk Mitigation (From admission, onward)         Ordered     IP VTE LOW RISK PATIENT  Once         12/18/22 1653     Place sequential compression device  Until discontinued         12/18/22 1653                Discharge Planning   CAMPBELL:      Code Status: Full Code   Is the patient medically ready for discharge?:     Reason for patient still in hospital (select all that apply): Pending disposition  Discharge Plan A: Skilled Nursing Facility                  Rosario Nichols NP  Department of Hospital Medicine   [facility] - Telemetry ([facility])

## 2022-12-22 NOTE — ASSESSMENT & PLAN NOTE
Does not necessarily appear like gout  Inflammation of bilateral LE toes  x1 dose Prednisone ordered  Gabapentin initiated as well

## 2022-12-22 NOTE — SUBJECTIVE & OBJECTIVE
Interval History: Bilateral toes remain inflamed x1 dose prednisone ordered    Review of Systems   Unable to perform ROS: Patient nonverbal   Objective:     Vital Signs (Most Recent):  Temp: 97.7 °F (36.5 °C) (12/22/22 1151)  Pulse: 84 (12/22/22 1151)  Resp: 17 (12/22/22 1151)  BP: 116/71 (12/22/22 1151)  SpO2: 97 % (12/22/22 1151) Vital Signs (24h Range):  Temp:  [97.5 °F (36.4 °C)-97.9 °F (36.6 °C)] 97.7 °F (36.5 °C)  Pulse:  [75-94] 84  Resp:  [17-18] 17  SpO2:  [95 %-98 %] 97 %  BP: (102-116)/(54-71) 116/71     Weight: 64.1 kg (141 lb 5 oz)  Body mass index is 25.03 kg/m².  No intake or output data in the 24 hours ending 12/22/22 1408   Physical Exam  HENT:      Head: Normocephalic and atraumatic.   Cardiovascular:      Rate and Rhythm: Normal rate and regular rhythm.      Heart sounds: No murmur heard.  Pulmonary:      Effort: Pulmonary effort is normal. No respiratory distress.      Breath sounds: Normal breath sounds. No wheezing.   Abdominal:      General: Bowel sounds are normal. There is no distension.      Palpations: Abdomen is soft.      Tenderness: There is no abdominal tenderness.   Musculoskeletal:         General: No swelling.   Skin:     General: Skin is warm and dry.   Neurological:      Mental Status: He is alert.      Motor: Weakness present.      Comments: Able to feed himself with RUE       Significant Labs: All pertinent labs within the past 24 hours have been reviewed.    Significant Imaging: I have reviewed all pertinent imaging results/findings within the past 24 hours.

## 2022-12-22 NOTE — PT/OT/SLP PROGRESS
Physical Therapy Treatment    Patient Name:  Albaro Langley   MRN:  [id number]    Recommendations:     Discharge Recommendations: nursing facility, skilled  Discharge Equipment Recommendations:  (TBD AT NEXT LEVEL OF CARE)  Barriers to discharge: None    Assessment:     Albaro Langley is a 52 y.o. male admitted with a medical diagnosis of Acute weakness.  He presents with the following impairments/functional limitations: weakness, impaired endurance, impaired cognition, impaired functional mobility, gait instability, impaired balance, pain, decreased safety awareness, decreased lower extremity function, decreased coordination.    Rehab Prognosis: Fair; patient would benefit from acute skilled PT services to address these deficits and reach maximum level of function.    Recent Surgery: * No surgery found *     Plan:     During this hospitalization, patient to be seen 3 x/week to address the identified rehab impairments via gait training, therapeutic activities, therapeutic exercises and progress toward the following goals:    Plan of Care Expires:  01/02/23    Subjective     Chief Complaint:   Patient/Family Comments/goals:   Pain/Comfort:  Pain Rating 1:  (PT UNABLE TO VERBALIZE PAIN BUT APPEARS TO BE RESISTIVE TO MOVEMENT/WBING ONTO FEET)      Objective:     Communicated with NURSE SAWYER prior to session.  Patient found supine with telemetry, peripheral IV, bed alarm upon PT entry to room.     General Precautions: Standard, fall  Orthopedic Precautions: N/A  Braces: N/A  Respiratory Status: Room air     Functional Mobility:  Bed Mobility:     Rolling Left:  stand by assistance  Scooting: stand by assistance  Supine to Sit: stand by assistance  Transfers:     Sit to 1/2 Stand:  contact guard assistance with no AD and hand-held assist-4 TRIALS, PT UNABLE TO STAND ERECT  Bed to Chair: contact guard assistance with  no AD and hand-held assist  using  Squat Pivot, CGA FOR TF TOWARD R SIDE(ASSUMING TO BE PT'S ROUTINE FROM  "HOME), MULTIPLE ATTEMPTS MADE TO TF FROM L SIDE WITH MAXA X 2 BUT PT UNABLE TO COMPLETE TF  Gait: UNABLE TO ASSESS  Balance: FAIR SITTING BALANCE, UNABLE TO COMPLETE FULL STAND SO POOR STANDING BALANCE    AM-PAC 6 CLICK MOBILITY  Turning over in bed (including adjusting bedclothes, sheets and blankets)?: 3  Sitting down on and standing up from a chair with arms (e.g., wheelchair, bedside commode, etc.): 3  Moving from lying on back to sitting on the side of the bed?: 3  Moving to and from a bed to a chair (including a wheelchair)?: 3  Need to walk in hospital room?: 3  Climbing 3-5 steps with a railing?: 1  Basic Mobility Total Score: 16     Treatment & Education:  REVIEW ROLE OF P.T. AND POC IN ACUTE CARE HOSPITAL SETTING, ENCOURAGED TO INCREASE TIME OOB IN CHAIR TO TOLERANCE, EDUCATED IN AND ENCOURAGED TO PERFORM BLE THEREX WHILE SEATED THROUGHOUT THE DAY TO TOLERANCE: HIP FLEX/EXT, LAQ, AP'S  PT EDUCATED ON RISK FOR FALLS DUE TO GENERALIZED WEAKNESS, EDUCATED ON "CALL DON'T FALL", ENCOURAGED TO CALL FOR ASSISTANCE WITH ALL NEEDS    Patient left up in chair with all lines intact, call button in reach, chair alarm on, NURSE notified, and SET UP WITH COLORING PAGES ..    GOALS:   Multidisciplinary Problems       Physical Therapy Goals          Problem: Physical Therapy    Goal Priority Disciplines Outcome Goal Variances Interventions   Physical Therapy Goal     PT, PT/OT Ongoing, Progressing     Description: LTG'S TO BE MET IN 14 DAYS (1-2-23)  PT WILL REQUIRE [facility] FOR BED MOBILITY  PT WILL REQUIRE [facility] FOR BED<>CHAIR TF'S  PT WILL  FEET WITH OR W/O RW AND MODA                         Time Tracking:     PT Received On: 12/22/22  PT Start Time: 1025     PT Stop Time: 1050  PT Total Time (min): 25 min     Billable Minutes: Therapeutic Activity 25    Treatment Type: Treatment  PT/PTA: PT     PTA Visit Number: 0     12/22/2022  "

## 2022-12-22 NOTE — PLAN OF CARE
Pt AAO x4.  Pt remains free of falls throughout shift.  Plan of care reviewed. Pt verbalizes understanding.  Pt moving and turning independently in bed.   Color sheets provided. TV on. Bed alarm on. Avasys in place.  Awaiting NH placement.

## 2022-12-22 NOTE — PT/OT/SLP PROGRESS
Occupational Therapy   Treatment    Name: Albaro Langley  MRN: [id number]  Admitting Diagnosis:  Acute weakness       Recommendations:     Discharge Recommendations: nursing facility, skilled  Discharge Equipment Recommendations:   (TBD at next level of care)  Barriers to discharge:  Decreased caregiver support    Assessment:     Albaro Langley is a 52 y.o. male with a medical diagnosis of Acute weakness.  He presents with the following performance deficits affecting function are weakness, impaired endurance, impaired self care skills, impaired functional mobility, impaired balance, impaired cardiopulmonary response to activity, decreased safety awareness, impaired cognition, impaired fine motor, impaired coordination.     Rehab Prognosis:  Fair; patient would benefit from acute skilled OT services to address these deficits and reach maximum level of function.       Plan:     Patient to be seen 2 x/week to address the above listed problems via self-care/home management, therapeutic activities, therapeutic exercises  Plan of Care Expires: 01/02/23  Plan of Care Reviewed with: patient    Subjective     Pain/Comfort:  Pain Rating 1:  (decreased nonverbal indicators of pain with WB on B feet)  Pain Addressed 1:  (activity pacing)    Objective:     Communicated with: Nurse, Galina, prior to session.  Patient found supine with telemetry, peripheral IV, bed alarm (blanca sys) upon OT entry to room.    General Precautions: Standard, fall    Orthopedic Precautions:N/A  Braces: N/A  Respiratory Status: Room air    Bed Mobility:    Patient completed Supine to Sit with contact guard assistance with increased time and significant encouragement to remain actively engaged    Functional Mobility/Transfers:  Therapist allowed patient to guide movement technique. Completed sit > 1/2 stand x4 trials with CGA, but patient unable to complete stand>pivot to bedside chair after completion to L. Therapist changed position of bedside chair to  R of patient and he was able to complete stand>pivot with min A.  Rest breaks between trials and continuous encouragement throughout.    [facility] 6 Click ADL: 7    Treatment & Education:  Patient tolerated intervention well this date. Decreased nonverbal indicators of pain with movement and with improved ability to engage in mobility. Inconsistent command following with attempts to complete B UE AROM therex, but good overall efforts. Educated on need to call for A to transfer back to bed.     Patient left up in chair with all lines intact, call button in reach, and chair alarm on    GOALS:   Multidisciplinary Problems       Occupational Therapy Goals          Problem: Occupational Therapy    Goal Priority Disciplines Outcome Interventions   Occupational Therapy Goal     OT, PT/OT Ongoing, Progressing    Description: Goals to be met by: 1/2/23     Patient will increase functional independence with ADLs by performing:    Toileting from bedside commode with Maximum Assistance for hygiene and clothing management.   Toilet transfer to bedside commode with Maximum Assistance.  Increased functional strength in B UE grossly to WFL.                         Time Tracking:     OT Date of Treatment: 12/22/22  OT Start Time: 1020  OT Stop Time: 1045  OT Total Time (min): 25 min    Billable Minutes:Therapeutic Activity 25    12/22/2022

## 2022-12-23 LAB
ALBUMIN SERPL BCP-MCNC: 2.9 G/DL (ref 3.5–5.2)
ANION GAP SERPL CALC-SCNC: 7 MMOL/L (ref 8–16)
BUN SERPL-MCNC: 20 MG/DL (ref 6–20)
CALCIUM SERPL-MCNC: 8.3 MG/DL (ref 8.7–10.5)
CHLORIDE SERPL-SCNC: 105 MMOL/L (ref 95–110)
CO2 SERPL-SCNC: 23 MMOL/L (ref 23–29)
CREAT SERPL-MCNC: 0.8 MG/DL (ref 0.5–1.4)
ERYTHROCYTE [SEDIMENTATION RATE] IN BLOOD BY WESTERGREN METHOD: 25 MM/HR (ref 0–10)
EST. GFR  (NO RACE VARIABLE): >60 ML/MIN/1.73 M^2
GLUCOSE SERPL-MCNC: 101 MG/DL (ref 70–110)
PHOSPHATE SERPL-MCNC: 2.9 MG/DL (ref 2.7–4.5)
POTASSIUM SERPL-SCNC: 4.1 MMOL/L (ref 3.5–5.1)
SODIUM SERPL-SCNC: 135 MMOL/L (ref 136–145)
URATE SERPL-MCNC: 7.1 MG/DL (ref 3.4–7)

## 2022-12-23 PROCEDURE — 80069 RENAL FUNCTION PANEL: CPT | Performed by: INTERNAL MEDICINE

## 2022-12-23 PROCEDURE — 84550 ASSAY OF BLOOD/URIC ACID: CPT | Performed by: INTERNAL MEDICINE

## 2022-12-23 PROCEDURE — 36415 COLL VENOUS BLD VENIPUNCTURE: CPT | Performed by: INTERNAL MEDICINE

## 2022-12-23 PROCEDURE — 85651 RBC SED RATE NONAUTOMATED: CPT | Performed by: INTERNAL MEDICINE

## 2022-12-23 PROCEDURE — 25000003 PHARM REV CODE 250: Performed by: NURSE PRACTITIONER

## 2022-12-23 PROCEDURE — G0378 HOSPITAL OBSERVATION PER HR: HCPCS

## 2022-12-23 RX ADMIN — CLOPIDOGREL BISULFATE 75 MG: 75 TABLET ORAL at 09:12

## 2022-12-23 RX ADMIN — GABAPENTIN 100 MG: 100 CAPSULE ORAL at 09:12

## 2022-12-23 RX ADMIN — GABAPENTIN 100 MG: 100 CAPSULE ORAL at 03:12

## 2022-12-23 RX ADMIN — GABAPENTIN 100 MG: 100 CAPSULE ORAL at 08:12

## 2022-12-23 RX ADMIN — ASPIRIN 81 MG: 81 TABLET, COATED ORAL at 09:12

## 2022-12-23 RX ADMIN — LEVOTHYROXINE SODIUM 88 MCG: 50 TABLET ORAL at 07:12

## 2022-12-23 NOTE — SUBJECTIVE & OBJECTIVE
Interval History: Prednisone resolved inflammation. Smiling eating breakfast    Review of Systems   Unable to perform ROS: Patient nonverbal   Objective:     Vital Signs (Most Recent):  Temp: 97.4 °F (36.3 °C) (12/23/22 1114)  Pulse: 77 (12/23/22 1114)  Resp: 18 (12/23/22 1114)  BP: 115/67 (12/23/22 1114)  SpO2: 96 % (12/23/22 1114) Vital Signs (24h Range):  Temp:  [97.4 °F (36.3 °C)-98.7 °F (37.1 °C)] 97.4 °F (36.3 °C)  Pulse:  [55-82] 77  Resp:  [17-18] 18  SpO2:  [95 %-98 %] 96 %  BP: (104-115)/(57-70) 115/67     Weight: 64.1 kg (141 lb 5 oz)  Body mass index is 25.03 kg/m².    Intake/Output Summary (Last 24 hours) at 12/23/2022 1236  Last data filed at 12/22/2022 1731  Gross per 24 hour   Intake 240 ml   Output --   Net 240 ml      Physical Exam  HENT:      Head: Normocephalic and atraumatic.   Cardiovascular:      Rate and Rhythm: Normal rate and regular rhythm.      Heart sounds: No murmur heard.  Pulmonary:      Effort: Pulmonary effort is normal. No respiratory distress.      Breath sounds: Normal breath sounds. No wheezing.   Abdominal:      General: Bowel sounds are normal. There is no distension.      Palpations: Abdomen is soft.      Tenderness: There is no abdominal tenderness.   Musculoskeletal:         General: No swelling.   Skin:     General: Skin is warm and dry.   Neurological:      Mental Status: He is alert.      Motor: Weakness present.      Comments: Able to feed himself with RUE       Significant Labs: All pertinent labs within the past 24 hours have been reviewed.    Significant Imaging: I have reviewed all pertinent imaging results/findings within the past 24 hours.

## 2022-12-23 NOTE — PROGRESS NOTES
[facility] Medicine  Progress Note    Patient Name: Albaro Langley  MRN: [id number]  Patient Class: OP- Observation   Admission Date: 12/18/2022  Length of Stay: 0 days  Attending Physician: Leslie Morris MD  Primary Care Provider: Primary Doctor No        Subjective:     Principal Problem:Acute weakness        HPI:  The patient is 53 yo male with past medical history of Down's syndrome and hypothyroidism who presented to the ED with acute onset of weakness. He was last seen normal around 1900. His caregiver woke him of around 0530 this morning and told him get dressed. The patient fell and was unable to dress himself. He also seemed confused per the caregiver. He was transported to ED via EMS as caregiver concerned patient may have had a stroke given his weakness and leaning to the left. His brother and sister were at bedside. Patient recently started soiling his pants. CT head no acute findings. Patient  unable to complete MRI. Hospital medicine consulted. Discussed additional labs and repeat head CT in am. Patient placed in observation.      Overview/Hospital Course:  Admitted for change in mentation. Unable to lay still for MRI. CTH showed no acute abnormality. Will obtain CTA head and neck negative but did show microvascular changes. Case discussed with Dr Morris and we collectively concluded that patient may have had a small CVA due to deficits he is exhibiting but unable to confirm as we are unable to obtain an MRI. Plan to initiate DAPT for 30 days followed by ASA only thereafter.  Echo essentialy WNL. SLP rec crushed meds and dysphagia diet. Placement will be difficult in nature due to Medicaid and disability. Toe inflammation resolved. Patient markedly improved and smiling, eating breakfast. Continue working with PT OT            Interval History: Prednisone resolved inflammation. Smiling eating breakfast    Review of Systems   Unable to perform ROS: Patient nonverbal    Objective:     Vital Signs (Most Recent):  Temp: 97.4 °F (36.3 °C) (12/23/22 1114)  Pulse: 77 (12/23/22 1114)  Resp: 18 (12/23/22 1114)  BP: 115/67 (12/23/22 1114)  SpO2: 96 % (12/23/22 1114) Vital Signs (24h Range):  Temp:  [97.4 °F (36.3 °C)-98.7 °F (37.1 °C)] 97.4 °F (36.3 °C)  Pulse:  [55-82] 77  Resp:  [17-18] 18  SpO2:  [95 %-98 %] 96 %  BP: (104-115)/(57-70) 115/67     Weight: 64.1 kg (141 lb 5 oz)  Body mass index is 25.03 kg/m².    Intake/Output Summary (Last 24 hours) at 12/23/2022 1236  Last data filed at 12/22/2022 1731  Gross per 24 hour   Intake 240 ml   Output --   Net 240 ml      Physical Exam  HENT:      Head: Normocephalic and atraumatic.   Cardiovascular:      Rate and Rhythm: Normal rate and regular rhythm.      Heart sounds: No murmur heard.  Pulmonary:      Effort: Pulmonary effort is normal. No respiratory distress.      Breath sounds: Normal breath sounds. No wheezing.   Abdominal:      General: Bowel sounds are normal. There is no distension.      Palpations: Abdomen is soft.      Tenderness: There is no abdominal tenderness.   Musculoskeletal:         General: No swelling.   Skin:     General: Skin is warm and dry.   Neurological:      Mental Status: He is alert.      Motor: Weakness present.      Comments: Able to feed himself with RUE       Significant Labs: All pertinent labs within the past 24 hours have been reviewed.    Significant Imaging: I have reviewed all pertinent imaging results/findings within the past 24 hours.      Assessment/Plan:      * Acute weakness  CT head no acute findings  MRI non-diagnostic due to movement  Obtain chest and pelvic x-ray  Labs unrevealing as well  Repeat head CT in am  PT/OT evaluation as unable to  ED    12/19:  Will obtain CTA head and neck to determine presence of stroke   Echo ordered and results pending   Will need higher level of care with possibly SNF placement PT OT eval and treat.   CM consulted    12/20:  CTH showed no acute  abnormality   Will obtain CTA head and neck negative but did show microvascular changes   Case discussed with Dr Morris and we collectively concluded that patient may have had a small CVA due to deficits he is exhibiting but unable to confirm as we are unable to obtain an MRI   DAPT for 30 days followed by ASA only thereafter    Echo essentialy WNL   SLP rec crushed meds and dysphagia diet  Placement will be difficult in nature due to Medicaid and disability    12/21:   Continue DAPT for 30 days   Continue PT OT  Pending placement           Inflammation of toe  Does not necessarily appear like gout  Inflammation of bilateral LE toes  x1 dose Prednisone ordered  Gabapentin initiated as well    12/23:  Resolved with x1 dose prednisone          Down syndrome  Usually ambulates without assistance at group home  May need placement if unable to ambulate   Assist with meals      Hypothyroid  Continue levothyroxine TSH elevated but free T4 within normal limits        VTE Risk Mitigation (From admission, onward)         Ordered     IP VTE LOW RISK PATIENT  Once         12/18/22 1653     Place sequential compression device  Until discontinued         12/18/22 1653                Discharge Planning   CAMPBELL:      Code Status: Full Code   Is the patient medically ready for discharge?:     Reason for patient still in hospital (select all that apply): Pending disposition  Discharge Plan A: Skilled Nursing Facility                  Rosario Nichols NP  Department of Hospital Medicine   [facility] - Telemetry ([facility])

## 2022-12-23 NOTE — ASSESSMENT & PLAN NOTE
Does not necessarily appear like gout  Inflammation of bilateral LE toes  x1 dose Prednisone ordered  Gabapentin initiated as well    12/23:  Resolved with x1 dose prednisone

## 2022-12-24 PROCEDURE — 25000003 PHARM REV CODE 250: Performed by: NURSE PRACTITIONER

## 2022-12-24 PROCEDURE — G0378 HOSPITAL OBSERVATION PER HR: HCPCS

## 2022-12-24 PROCEDURE — 97530 THERAPEUTIC ACTIVITIES: CPT | Mod: CQ

## 2022-12-24 PROCEDURE — 63600175 PHARM REV CODE 636 W HCPCS: Performed by: NURSE PRACTITIONER

## 2022-12-24 RX ORDER — ACETAMINOPHEN 325 MG/1
650 TABLET ORAL EVERY 6 HOURS PRN
Status: DISCONTINUED | OUTPATIENT
Start: 2022-12-24 | End: 2022-12-29 | Stop reason: HOSPADM

## 2022-12-24 RX ORDER — PREDNISONE 20 MG/1
20 TABLET ORAL DAILY
Status: DISCONTINUED | OUTPATIENT
Start: 2022-12-24 | End: 2022-12-28

## 2022-12-24 RX ADMIN — CLOPIDOGREL BISULFATE 75 MG: 75 TABLET ORAL at 08:12

## 2022-12-24 RX ADMIN — GABAPENTIN 100 MG: 100 CAPSULE ORAL at 08:12

## 2022-12-24 RX ADMIN — LEVOTHYROXINE SODIUM 88 MCG: 50 TABLET ORAL at 06:12

## 2022-12-24 RX ADMIN — ASPIRIN 81 MG: 81 TABLET, COATED ORAL at 08:12

## 2022-12-24 RX ADMIN — ACETAMINOPHEN 650 MG: 325 TABLET ORAL at 03:12

## 2022-12-24 RX ADMIN — PREDNISONE 20 MG: 20 TABLET ORAL at 12:12

## 2022-12-24 NOTE — PT/OT/SLP PROGRESS
Physical Therapy  Treatment    Albaro Langley   MRN: [id number]   Admitting Diagnosis: Acute weakness       PT Start Time: 1000     PT Stop Time: 1040    PT Total Time (min): 40 min       Billable Minutes:  Therapeutic Activity 40    Treatment Type: Treatment  PT/PTA: PTA     PTA Visit Number: 1       General Precautions: Standard, fall  Orthopedic Precautions: N/A  Braces: N/A     Subjective:  Communicated with JIM ALDRIDGE prior to session.      Pain/Comfort  Pain Rating 1: 0/10  Pain Rating Post-Intervention 1: 0/10    Treatment and Education:    ROLLING L/R MOD A WITH PT USING RAILS APPROPRIATELY    SIT<-->STAND MAX A VC FOR UPPER EXTREMITY PLACEMENT WITH PT RESISTING MOVEMENT WITH HIM HOLDING ONTO BS COMMODE DURING COMMODE TO BED T/F.     STAND PIVOT BED TO COMMODE MAX A AS DESCRIBED ABOVE.     PT SAT EOB AND ON BED SIDE COMMODE WITH SBA AND REQUIRED MAX A FROM NSG STAFF AND PTA FOR HYGIENE AS PT WAS UNABLE TO COMMUNICATE HIS NEEDS.      AM-PAC 6 CLICK MOBILITY  How much help from another person does this patient currently need?   1 = Unable, Total/Dependent Assistance  2 = A lot, Maximum/Moderate Assistance  3 = A little, Minimum/Contact Guard/Supervision  4 = None, Modified [place]/Independent    Turning over in bed (including adjusting bedclothes, sheets and blankets)?: 3  Sitting down on and standing up from a chair with arms (e.g., wheelchair, bedside commode, etc.): 2  Moving from lying on back to sitting on the side of the bed?: 3  Moving to and from a bed to a chair (including a wheelchair)?: 2  Need to walk in hospital room?: 1  Climbing 3-5 steps with a railing?: 1  Basic Mobility Total Score: 12    AM-PAC Raw Score CMS G-Code Modifier Level of Impairment Assistance   6 % Total / Unable   7 - 9 CM 80 - 100% Maximal Assist   10 - 14 CL 60 - 80% Moderate Assist   15 - 19 CK 40 - 60% Moderate Assist   20 - 22 CJ 20 - 40% Minimal Assist   23 CI 1-20% SBA / CGA   24 CH 0% Independent/ Mod I      Patient left supine with all lines intact, call button in reach, bed alarm on, and NSG present.    Assessment:  Albaro Langley is a 52 y.o. male with a medical diagnosis of Acute weakness and presents with .    Rehab identified problem list/impairments: weakness, gait instability, decreased ROM, impaired coordination, decreased lower extremity function, impaired balance, impaired endurance, decreased safety awareness, impaired skin, impaired cognition, impaired self care skills, impaired functional mobility, decreased coordination    Rehab potential is poor.    Activity tolerance: Poor    Discharge recommendations: nursing facility, skilled      Barriers to discharge:      Equipment recommendations: bedside commode, hospital bed, wheelchair     GOALS:   Multidisciplinary Problems       Physical Therapy Goals          Problem: Physical Therapy    Goal Priority Disciplines Outcome Goal Variances Interventions   Physical Therapy Goal     PT, PT/OT Ongoing, Progressing     Description: LTG'S TO BE MET IN 14 DAYS (1-2-23)  PT WILL REQUIRE MODA FOR BED MOBILITY  PT WILL REQUIRE MODA FOR BED<>CHAIR TF'S  PT WILL  FEET WITH OR W/O RW AND MODA                         PLAN:    Patient to be seen 3 x/week to address the above listed problems via therapeutic activities, therapeutic exercises  Plan of Care expires: 01/02/23  Plan of Care reviewed with: patient         12/24/2022

## 2022-12-24 NOTE — SUBJECTIVE & OBJECTIVE
Interval History: NAD. Pending placement     Review of Systems   Unable to perform ROS: Patient nonverbal   Objective:     Vital Signs (Most Recent):  Temp: 98.2 °F (36.8 °C) (12/24/22 0730)  Pulse: 75 (12/24/22 0730)  Resp: 18 (12/24/22 0730)  BP: 108/74 (12/24/22 0730)  SpO2: 96 % (12/24/22 0730) Vital Signs (24h Range):  Temp:  [97.4 °F (36.3 °C)-99 °F (37.2 °C)] 98.2 °F (36.8 °C)  Pulse:  [71-82] 75  Resp:  [16-18] 18  SpO2:  [94 %-99 %] 96 %  BP: ()/(55-74) 108/74     Weight: 64.1 kg (141 lb 5 oz)  Body mass index is 25.03 kg/m².    Intake/Output Summary (Last 24 hours) at 12/24/2022 1112  Last data filed at 12/23/2022 1245  Gross per 24 hour   Intake 180 ml   Output --   Net 180 ml      Physical Exam  HENT:      Head: Normocephalic and atraumatic.   Cardiovascular:      Rate and Rhythm: Normal rate and regular rhythm.      Heart sounds: No murmur heard.  Pulmonary:      Effort: Pulmonary effort is normal. No respiratory distress.      Breath sounds: Normal breath sounds. No wheezing.   Abdominal:      General: Bowel sounds are normal. There is no distension.      Palpations: Abdomen is soft.      Tenderness: There is no abdominal tenderness.   Musculoskeletal:         General: No swelling.   Skin:     General: Skin is warm and dry.   Neurological:      Mental Status: He is alert.      Motor: Weakness present.      Comments: Able to feed himself with RUE       Significant Labs: All pertinent labs within the past 24 hours have been reviewed.    Significant Imaging: I have reviewed all pertinent imaging results/findings within the past 24 hours.

## 2022-12-24 NOTE — PLAN OF CARE
ROLLING L/R MOD A WITH PT USING RAILS APPROPRIATELY    SIT<-->STAND MAX A VC FOR UPPER EXTREMITY PLACEMENT WITH PT RESISTING MOVEMENT WITH HIM HOLDING ONTO BS COMMODE DURING COMMODE TO BED T/F.     STAND PIVOT BED TO COMMODE MAX A AS DESCRIBED ABOVE.     PT SAT EOB AND ON BED SIDE COMMODE WITH SBA AND REQUIRED MAX A FROM NSG STAFF AND PTA FOR HYGIENE AS PT WAS UNABLE TO COMMUNICATE HIS NEEDS.

## 2022-12-24 NOTE — PROGRESS NOTES
[facility] Medicine  Progress Note    Patient Name: Albaro Langley  MRN: [id number]  Patient Class: OP- Observation   Admission Date: 12/18/2022  Length of Stay: 0 days  Attending Physician: Leslie Morris MD  Primary Care Provider: Primary Doctor No        Subjective:     Principal Problem:Acute weakness        HPI:  The patient is 51 yo male with past medical history of Down's syndrome and hypothyroidism who presented to the ED with acute onset of weakness. He was last seen normal around 1900. His caregiver woke him of around 0530 this morning and told him get dressed. The patient fell and was unable to dress himself. He also seemed confused per the caregiver. He was transported to ED via EMS as caregiver concerned patient may have had a stroke given his weakness and leaning to the left. His brother and sister were at bedside. Patient recently started soiling his pants. CT head no acute findings. Patient  unable to complete MRI. Hospital medicine consulted. Discussed additional labs and repeat head CT in am. Patient placed in observation.      Overview/Hospital Course:  Admitted for change in mentation. Unable to lay still for MRI. CTH showed no acute abnormality. Will obtain CTA head and neck negative but did show microvascular changes. Case discussed with Dr Morris and we collectively concluded that patient may have had a small CVA due to deficits he is exhibiting but unable to confirm as we are unable to obtain an MRI. Plan to initiate DAPT for 30 days followed by ASA only thereafter.  Echo essentialy WNL. SLP rec crushed meds and dysphagia diet. Placement will be difficult in nature due to Medicaid and disability. Toe inflammation resolved. Patient markedly improved and smiling, eating breakfast. Continue working with PT OT            Interval History: NAD. Pending placement     Review of Systems   Unable to perform ROS: Patient nonverbal   Objective:     Vital Signs (Most  Recent):  Temp: 98.2 °F (36.8 °C) (12/24/22 0730)  Pulse: 75 (12/24/22 0730)  Resp: 18 (12/24/22 0730)  BP: 108/74 (12/24/22 0730)  SpO2: 96 % (12/24/22 0730) Vital Signs (24h Range):  Temp:  [97.4 °F (36.3 °C)-99 °F (37.2 °C)] 98.2 °F (36.8 °C)  Pulse:  [71-82] 75  Resp:  [16-18] 18  SpO2:  [94 %-99 %] 96 %  BP: ()/(55-74) 108/74     Weight: 64.1 kg (141 lb 5 oz)  Body mass index is 25.03 kg/m².    Intake/Output Summary (Last 24 hours) at 12/24/2022 1112  Last data filed at 12/23/2022 1245  Gross per 24 hour   Intake 180 ml   Output --   Net 180 ml      Physical Exam  HENT:      Head: Normocephalic and atraumatic.   Cardiovascular:      Rate and Rhythm: Normal rate and regular rhythm.      Heart sounds: No murmur heard.  Pulmonary:      Effort: Pulmonary effort is normal. No respiratory distress.      Breath sounds: Normal breath sounds. No wheezing.   Abdominal:      General: Bowel sounds are normal. There is no distension.      Palpations: Abdomen is soft.      Tenderness: There is no abdominal tenderness.   Musculoskeletal:         General: No swelling.   Skin:     General: Skin is warm and dry.   Neurological:      Mental Status: He is alert.      Motor: Weakness present.      Comments: Able to feed himself with RUE       Significant Labs: All pertinent labs within the past 24 hours have been reviewed.    Significant Imaging: I have reviewed all pertinent imaging results/findings within the past 24 hours.      Assessment/Plan:      * Acute weakness  CT head no acute findings  MRI non-diagnostic due to movement  Obtain chest and pelvic x-ray  Labs unrevealing as well  Repeat head CT in am  PT/OT evaluation as unable to  ED    12/19:  Will obtain CTA head and neck to determine presence of stroke   Echo ordered and results pending   Will need higher level of care with possibly SNF placement PT OT eval and treat.   CM consulted    12/20:  CTH showed no acute abnormality   Will obtain CTA head and neck  negative but did show microvascular changes   Case discussed with Dr Morris and we collectively concluded that patient may have had a small CVA due to deficits he is exhibiting but unable to confirm as we are unable to obtain an MRI   DAPT for 30 days followed by ASA only thereafter    Echo essentialy WNL   SLP rec crushed meds and dysphagia diet  Placement will be difficult in nature due to Medicaid and disability    12/21:   Continue DAPT for 30 days   Continue PT OT  Pending placement           Inflammation of toe  Does not necessarily appear like gout  Inflammation of bilateral LE toes  x1 dose Prednisone ordered  Gabapentin initiated as well    12/23:  Resolved with x1 dose prednisone          Down syndrome  Usually ambulates without assistance at group home  May need placement if unable to ambulate   Assist with meals      Hypothyroid  Continue levothyroxine TSH elevated but free T4 within normal limits        VTE Risk Mitigation (From admission, onward)         Ordered     IP VTE LOW RISK PATIENT  Once         12/18/22 1653     Place sequential compression device  Until discontinued         12/18/22 1653                Discharge Planning   CAMPBELL:      Code Status: Full Code   Is the patient medically ready for discharge?:     Reason for patient still in hospital (select all that apply): Pending disposition  Discharge Plan A: Skilled Nursing Facility                  Rosario Nichols NP  Department of Hospital Medicine   [facility] - Telemetry ([facility])

## 2022-12-25 PROCEDURE — 63600175 PHARM REV CODE 636 W HCPCS: Performed by: NURSE PRACTITIONER

## 2022-12-25 PROCEDURE — G0378 HOSPITAL OBSERVATION PER HR: HCPCS

## 2022-12-25 PROCEDURE — 25000003 PHARM REV CODE 250: Performed by: NURSE PRACTITIONER

## 2022-12-25 RX ADMIN — PREDNISONE 20 MG: 20 TABLET ORAL at 09:12

## 2022-12-25 RX ADMIN — CLOPIDOGREL BISULFATE 75 MG: 75 TABLET ORAL at 09:12

## 2022-12-25 RX ADMIN — ASPIRIN 81 MG: 81 TABLET, COATED ORAL at 09:12

## 2022-12-25 RX ADMIN — LEVOTHYROXINE SODIUM 88 MCG: 50 TABLET ORAL at 06:12

## 2022-12-25 NOTE — PROGRESS NOTES
[facility] Medicine  Progress Note    Patient Name: Albaro Langley  MRN: [id number]  Patient Class: OP- Observation   Admission Date: 12/18/2022  Length of Stay: 0 days  Attending Physician: Leslie Morris MD  Primary Care Provider: Primary Doctor No        Subjective:     Principal Problem:Acute weakness        HPI:  The patient is 53 yo male with past medical history of Down's syndrome and hypothyroidism who presented to the ED with acute onset of weakness. He was last seen normal around 1900. His caregiver woke him of around 0530 this morning and told him get dressed. The patient fell and was unable to dress himself. He also seemed confused per the caregiver. He was transported to ED via EMS as caregiver concerned patient may have had a stroke given his weakness and leaning to the left. His brother and sister were at bedside. Patient recently started soiling his pants. CT head no acute findings. Patient  unable to complete MRI. Hospital medicine consulted. Discussed additional labs and repeat head CT in am. Patient placed in observation.      Overview/Hospital Course:  Admitted for change in mentation. Unable to lay still for MRI. CTH showed no acute abnormality. Will obtain CTA head and neck negative but did show microvascular changes. Case discussed with Dr Morris and we collectively concluded that patient may have had a small CVA due to deficits he is exhibiting but unable to confirm as we are unable to obtain an MRI. Plan to initiate DAPT for 30 days followed by ASA only thereafter.  Echo essentialy WNL. SLP rec crushed meds and dysphagia diet. Placement will be difficult in nature due to Medicaid and disability. Toe inflammation resolved. Patient markedly improved and smiling, able to communicate with simple words and gestures, able to follow simple commands. Sitting in bed, coloring this AM, able to feed self. Continue working with PT OT.             Interval History:  Patient alert this AM, coloring, able to feed self, answers simple yes/no questions, able to follow commands.     Review of Systems   Unable to perform ROS: Patient nonverbal (limited)   Gastrointestinal:  Negative for abdominal pain.   Musculoskeletal:         Foot pain-bilateral   Objective:     Vital Signs (Most Recent):  Temp: 97.6 °F (36.4 °C) (12/25/22 1142)  Pulse: 68 (12/25/22 1142)  Resp: 18 (12/25/22 1142)  BP: 100/61 (12/25/22 1142)  SpO2: (!) 94 % (12/25/22 1142)   Vital Signs (24h Range):  Temp:  [97.5 °F (36.4 °C)-98.8 °F (37.1 °C)] 97.6 °F (36.4 °C)  Pulse:  [56-85] 68  Resp:  [16-20] 18  SpO2:  [92 %-95 %] 94 %  BP: (100-126)/(59-75) 100/61     Weight: 64.5 kg (142 lb 3.2 oz)  Body mass index is 25.19 kg/m².  No intake or output data in the 24 hours ending 12/25/22 1500   Physical Exam  HENT:      Head: Normocephalic and atraumatic.   Cardiovascular:      Rate and Rhythm: Normal rate and regular rhythm.      Heart sounds: No murmur heard.  Pulmonary:      Effort: Pulmonary effort is normal. No respiratory distress.      Breath sounds: Normal breath sounds. No wheezing.   Abdominal:      General: Bowel sounds are normal. There is no distension.      Palpations: Abdomen is soft.      Tenderness: There is no abdominal tenderness.   Musculoskeletal:         General: No swelling.   Skin:     General: Skin is warm and dry.   Neurological:      Mental Status: He is alert.      Motor: Weakness present.      Comments: Able to feed himself with RUE       Significant Labs: All pertinent labs within the past 24 hours have been reviewed.      Significant Imaging: I have reviewed all pertinent imaging results/findings within the past 24 hours.      Assessment/Plan:      * Acute weakness  CT head no acute findings  MRI non-diagnostic due to movement  Obtain chest and pelvic x-ray  Labs unrevealing as well  Repeat head CT in am  PT/OT evaluation as unable to  ED    12/19:  Will obtain CTA head and neck to  determine presence of stroke   Echo ordered and results pending   Will need higher level of care with possibly SNF placement PT OT eval and treat.   CM consulted    12/20:  CTH showed no acute abnormality   Will obtain CTA head and neck negative but did show microvascular changes   Case discussed with Dr Morris and we collectively concluded that patient may have had a small CVA due to deficits he is exhibiting but unable to confirm as we are unable to obtain an MRI   DAPT for 30 days followed by ASA only thereafter    Echo essentialy WNL   SLP rec crushed meds and dysphagia diet  Placement will be difficult in nature due to Medicaid and disability    12/21:   Continue DAPT for 30 days   Continue PT OT  Pending placement- needs level II, pending          Inflammation of toe  Does not necessarily appear like gout  Inflammation of bilateral LE toes  x1 dose Prednisone ordered  Gabapentin initiated as well    12/23:  Resolved with x1 dose prednisone          Down syndrome  Usually ambulates without assistance at group home, group home will accept back if he gains back the ability to ambulate and return closer to his previous baseline.   May need placement if unable to ambulate- accepted by SNF, pending Level [facility] by [facility]  Assist with meals      Hypothyroid  Continue levothyroxine TSH elevated but free T4 within normal limits        VTE Risk Mitigation (From admission, onward)         Ordered     IP VTE LOW RISK PATIENT  Once         12/18/22 1653     Place sequential compression device  Until discontinued         12/18/22 1653                Discharge Planning   CAMPBELL:      Code Status: Full Code   Is the patient medically ready for discharge?:     Reason for patient still in hospital (select all that apply): Patient trending condition  Discharge Plan A: Skilled Nursing Facility                  Joselin Tomlin NP  Department of Hospital Medicine   [facility] - Telemetry ([facility])

## 2022-12-25 NOTE — ASSESSMENT & PLAN NOTE
Usually ambulates without assistance at group home, group home will accept back if he gains back the ability to ambulate and return closer to his previous baseline.   May need placement if unable to ambulate- accepted by SNF, pending Level II by [facility]  Assist with meals

## 2022-12-25 NOTE — ASSESSMENT & PLAN NOTE
CT head no acute findings  MRI non-diagnostic due to movement  Obtain chest and pelvic x-ray  Labs unrevealing as well  Repeat head CT in am  PT/OT evaluation as unable to  ED    12/19:  Will obtain CTA head and neck to determine presence of stroke   Echo ordered and results pending   Will need higher level of care with possibly SNF placement PT OT eval and treat.   CM consulted    12/20:  CTH showed no acute abnormality   Will obtain CTA head and neck negative but did show microvascular changes   Case discussed with Dr Morris and we collectively concluded that patient may have had a small CVA due to deficits he is exhibiting but unable to confirm as we are unable to obtain an MRI   DAPT for 30 days followed by ASA only thereafter    Echo essentialy WNL   SLP rec crushed meds and dysphagia diet  Placement will be difficult in nature due to Medicaid and disability    12/21:   Continue DAPT for 30 days   Continue PT OT  Pending placement- needs level II, pending

## 2022-12-26 LAB
ALBUMIN SERPL BCP-MCNC: 3.1 G/DL (ref 3.5–5.2)
ALP SERPL-CCNC: 61 U/L (ref 55–135)
ALT SERPL W/O P-5'-P-CCNC: 30 U/L (ref 10–44)
ANION GAP SERPL CALC-SCNC: 10 MMOL/L (ref 8–16)
AST SERPL-CCNC: 25 U/L (ref 10–40)
BASOPHILS # BLD AUTO: 0.06 K/UL (ref 0–0.2)
BASOPHILS NFR BLD: 0.6 % (ref 0–1.9)
BILIRUB SERPL-MCNC: 0.4 MG/DL (ref 0.1–1)
BUN SERPL-MCNC: 20 MG/DL (ref 6–20)
CALCIUM SERPL-MCNC: 8.8 MG/DL (ref 8.7–10.5)
CHLORIDE SERPL-SCNC: 104 MMOL/L (ref 95–110)
CO2 SERPL-SCNC: 25 MMOL/L (ref 23–29)
CREAT SERPL-MCNC: 1 MG/DL (ref 0.5–1.4)
DIFFERENTIAL METHOD: ABNORMAL
EOSINOPHIL # BLD AUTO: 0 K/UL (ref 0–0.5)
EOSINOPHIL NFR BLD: 0.2 % (ref 0–8)
ERYTHROCYTE [DISTWIDTH] IN BLOOD BY AUTOMATED COUNT: 12.7 % (ref 11.5–14.5)
EST. GFR  (NO RACE VARIABLE): >60 ML/MIN/1.73 M^2
GLUCOSE SERPL-MCNC: 89 MG/DL (ref 70–110)
HCT VFR BLD AUTO: 41.3 % (ref 40–54)
HGB BLD-MCNC: 14.1 G/DL (ref 14–18)
IMM GRANULOCYTES # BLD AUTO: 0.06 K/UL (ref 0–0.04)
IMM GRANULOCYTES NFR BLD AUTO: 0.6 % (ref 0–0.5)
LYMPHOCYTES # BLD AUTO: 2.5 K/UL (ref 1–4.8)
LYMPHOCYTES NFR BLD: 26.3 % (ref 18–48)
MCH RBC QN AUTO: 32.3 PG (ref 27–31)
MCHC RBC AUTO-ENTMCNC: 34.1 G/DL (ref 32–36)
MCV RBC AUTO: 95 FL (ref 82–98)
MONOCYTES # BLD AUTO: 0.7 K/UL (ref 0.3–1)
MONOCYTES NFR BLD: 7.4 % (ref 4–15)
NEUTROPHILS # BLD AUTO: 6.2 K/UL (ref 1.8–7.7)
NEUTROPHILS NFR BLD: 64.9 % (ref 38–73)
NRBC BLD-RTO: 0 /100 WBC
PLATELET # BLD AUTO: 217 K/UL (ref 150–450)
PMV BLD AUTO: 10.4 FL (ref 9.2–12.9)
POTASSIUM SERPL-SCNC: 3.8 MMOL/L (ref 3.5–5.1)
PROT SERPL-MCNC: 6.4 G/DL (ref 6–8.4)
RBC # BLD AUTO: 4.36 M/UL (ref 4.6–6.2)
SODIUM SERPL-SCNC: 139 MMOL/L (ref 136–145)
WBC # BLD AUTO: 9.49 K/UL (ref 3.9–12.7)

## 2022-12-26 PROCEDURE — 85025 COMPLETE CBC W/AUTO DIFF WBC: CPT | Performed by: NURSE PRACTITIONER

## 2022-12-26 PROCEDURE — 63600175 PHARM REV CODE 636 W HCPCS: Performed by: NURSE PRACTITIONER

## 2022-12-26 PROCEDURE — 25000003 PHARM REV CODE 250: Performed by: NURSE PRACTITIONER

## 2022-12-26 PROCEDURE — 80053 COMPREHEN METABOLIC PANEL: CPT | Performed by: NURSE PRACTITIONER

## 2022-12-26 PROCEDURE — G0378 HOSPITAL OBSERVATION PER HR: HCPCS

## 2022-12-26 PROCEDURE — 36415 COLL VENOUS BLD VENIPUNCTURE: CPT | Performed by: NURSE PRACTITIONER

## 2022-12-26 RX ADMIN — ASPIRIN 81 MG: 81 TABLET, COATED ORAL at 09:12

## 2022-12-26 RX ADMIN — CLOPIDOGREL BISULFATE 75 MG: 75 TABLET ORAL at 09:12

## 2022-12-26 RX ADMIN — LEVOTHYROXINE SODIUM 88 MCG: 50 TABLET ORAL at 05:12

## 2022-12-26 RX ADMIN — PREDNISONE 20 MG: 20 TABLET ORAL at 09:12

## 2022-12-26 NOTE — SUBJECTIVE & OBJECTIVE
Interval History: Stable, Pending SNF authorization, Level II pending.     Review of Systems   Unable to perform ROS: Patient nonverbal (limited)   Gastrointestinal:  Negative for abdominal pain.   Musculoskeletal:         Foot pain-bilateral   Objective:     Vital Signs (Most Recent):  Temp: 96.6 °F (35.9 °C) (12/26/22 1204)  Pulse: 68 (12/26/22 1204)  Resp: 16 (12/26/22 1204)  BP: (!) 94/52 (12/26/22 1204)  SpO2: 95 % (12/26/22 1204)   Vital Signs (24h Range):  Temp:  [96.6 °F (35.9 °C)-98.6 °F (37 °C)] 96.6 °F (35.9 °C)  Pulse:  [62-79] 68  Resp:  [16-20] 16  SpO2:  [93 %-95 %] 95 %  BP: ()/(51-58) 94/52     Weight: 64.5 kg (142 lb 3.2 oz)  Body mass index is 25.19 kg/m².    Intake/Output Summary (Last 24 hours) at 12/26/2022 1403  Last data filed at 12/26/2022 0545  Gross per 24 hour   Intake --   Output 200 ml   Net -200 ml      Physical Exam  Vitals and nursing note reviewed.   HENT:      Head: Normocephalic and atraumatic.   Cardiovascular:      Rate and Rhythm: Normal rate and regular rhythm.      Heart sounds: No murmur heard.  Pulmonary:      Effort: Pulmonary effort is normal. No respiratory distress.      Breath sounds: Normal breath sounds. No wheezing.   Abdominal:      General: Bowel sounds are normal. There is no distension.      Palpations: Abdomen is soft.      Tenderness: There is no abdominal tenderness.   Musculoskeletal:         General: No swelling.   Skin:     General: Skin is warm and dry.   Neurological:      Mental Status: He is alert.      Motor: Weakness present.      Comments: Able to feed himself with RUE       Significant Labs: All pertinent labs within the past 24 hours have been reviewed.  CBC:   Recent Labs   Lab 12/26/22  0823   WBC 9.49   HGB 14.1   HCT 41.3        CMP:   Recent Labs   Lab 12/26/22  0823      K 3.8      CO2 25   GLU 89   BUN 20   CREATININE 1.0   CALCIUM 8.8   PROT 6.4   ALBUMIN 3.1*   BILITOT 0.4   ALKPHOS 61   AST 25   ALT 30   ANIONGAP  10       Significant Imaging: I have reviewed all pertinent imaging results/findings within the past 24 hours.

## 2022-12-26 NOTE — PROGRESS NOTES
[facility]  Progress Note    Patient Name: Albaro Langley  MRN: [id number]  Patient Class: OP- Observation   Admission Date: 12/18/2022  Length of Stay: 0 days  Attending Physician: Lowell Metz, *  Primary Care Provider: Primary Doctor No        Subjective:     Principal Problem:Acute weakness        HPI:  The patient is 51 yo male with past medical history of Down's syndrome and hypothyroidism who presented to the ED with acute onset of weakness. He was last seen normal around 1900. His caregiver woke him of around 0530 this morning and told him get dressed. The patient fell and was unable to dress himself. He also seemed confused per the caregiver. He was transported to ED via EMS as caregiver concerned patient may have had a stroke given his weakness and leaning to the left. His brother and sister were at bedside. Patient recently started soiling his pants. CT head no acute findings. Patient  unable to complete MRI. Hospital medicine consulted. Discussed additional labs and repeat head CT in am. Patient placed in observation.      Overview/Hospital Course:  Admitted for change in mentation. Unable to lay still for MRI. CTH showed no acute abnormality. Will obtain CTA head and neck negative but did show microvascular changes. Case discussed with Dr Morris and we collectively concluded that patient may have had a small CVA due to deficits he is exhibiting but unable to confirm as we are unable to obtain an MRI. Plan to initiate DAPT for 30 days followed by ASA only thereafter.  Echo essentialy WNL. SLP rec crushed meds and dysphagia diet. Placement will be difficult in nature due to Medicaid and disability. Toe inflammation resolved. Patient markedly improved and smiling, able to communicate with simple words and gestures, able to follow simple commands. Sitting in bed, activities being provided, able to feed self. Continue working with PT OT. Labs stable.              Interval History: Stable, Pending SNF authorization, Level II pending.     Review of Systems   Unable to perform ROS: Patient nonverbal (limited)   Gastrointestinal:  Negative for abdominal pain.   Musculoskeletal:         Foot pain-bilateral   Objective:     Vital Signs (Most Recent):  Temp: 96.6 °F (35.9 °C) (12/26/22 1204)  Pulse: 68 (12/26/22 1204)  Resp: 16 (12/26/22 1204)  BP: (!) 94/52 (12/26/22 1204)  SpO2: 95 % (12/26/22 1204)   Vital Signs (24h Range):  Temp:  [96.6 °F (35.9 °C)-98.6 °F (37 °C)] 96.6 °F (35.9 °C)  Pulse:  [62-79] 68  Resp:  [16-20] 16  SpO2:  [93 %-95 %] 95 %  BP: ()/(51-58) 94/52     Weight: 64.5 kg (142 lb 3.2 oz)  Body mass index is 25.19 kg/m².    Intake/Output Summary (Last 24 hours) at 12/26/2022 1403  Last data filed at 12/26/2022 0545  Gross per 24 hour   Intake --   Output 200 ml   Net -200 ml      Physical Exam  Vitals and nursing note reviewed.   HENT:      Head: Normocephalic and atraumatic.   Cardiovascular:      Rate and Rhythm: Normal rate and regular rhythm.      Heart sounds: No murmur heard.  Pulmonary:      Effort: Pulmonary effort is normal. No respiratory distress.      Breath sounds: Normal breath sounds. No wheezing.   Abdominal:      General: Bowel sounds are normal. There is no distension.      Palpations: Abdomen is soft.      Tenderness: There is no abdominal tenderness.   Musculoskeletal:         General: No swelling.   Skin:     General: Skin is warm and dry.   Neurological:      Mental Status: He is alert.      Motor: Weakness present.      Comments: Able to feed himself with RUE       Significant Labs: All pertinent labs within the past 24 hours have been reviewed.  CBC:   Recent Labs   Lab 12/26/22  0823   WBC 9.49   HGB 14.1   HCT 41.3        CMP:   Recent Labs   Lab 12/26/22  0823      K 3.8      CO2 25   GLU 89   BUN 20   CREATININE 1.0   CALCIUM 8.8   PROT 6.4   ALBUMIN 3.1*   BILITOT 0.4   ALKPHOS 61   AST 25   ALT  30   ANIONGAP 10       Significant Imaging: I have reviewed all pertinent imaging results/findings within the past 24 hours.      Assessment/Plan:      * Acute weakness  CT head no acute findings  MRI non-diagnostic due to movement  Obtain chest and pelvic x-ray  Continue PT/OT       Will need higher level of care with possibly SNF placement PT OT eval and treat.- Group home will take patient back if able to improve, request SNF for PT    CTH showed no acute abnormality   Will obtain CTA head and neck negative but did show microvascular changes   Case discussed with Dr Morris and we collectively concluded that patient may have had a small CVA due to deficits he is exhibiting but unable to confirm as we are unable to obtain an MRI   DAPT for 30 days followed by ASA only thereafter    Echo essentialy WNL   SLP rec crushed meds and dysphagia diet    Current:   Continue DAPT for 30 days   Continue PT OT  Pending placement- needs level II, pending          Inflammation of toe  Does not necessarily appear like gout  Inflammation of bilateral LE toes  x1 dose Prednisone ordered  Gabapentin initiated as well    12/23:  Resolved with x1 dose prednisone          Down syndrome  Usually ambulates without assistance at group home, group home will accept back if he gains back the ability to ambulate and return closer to his previous baseline.   May need placement if unable to ambulate- accepted by SNF, pending Level II by [facility]  Assist with meals      Hypothyroid  Continue levothyroxine TSH elevated but free T4 within normal limits        VTE Risk Mitigation (From admission, onward)         Ordered     IP VTE LOW RISK PATIENT  Once         12/18/22 1653     Place sequential compression device  Until discontinued         12/18/22 1653                Discharge Planning   CAMPBELL:      Code Status: Full Code   Is the patient medically ready for discharge?:     Reason for patient still in hospital (select all that apply): Patient  trending condition  Discharge Plan A: Skilled Nursing Facility                  April J BELLE Tomlin  Department of Hospital Medicine   [facility] - Telemetry ([facility])

## 2022-12-26 NOTE — PLAN OF CARE
Oriented to self.   Pt remains free of falls throughout shift.  Pt denies pain throughout shift.  Plan of care reviewed. Pt verbalizes understanding.  Pt moving and turning independently. Frequent weight shifting encouraged.  No tele monitor ordered.  Hourly rounding completed.  Awaiting NH placement.

## 2022-12-26 NOTE — ASSESSMENT & PLAN NOTE
Usually ambulates without assistance at group home, group home will accept back if he gains back the ability to ambulate and return closer to his previous baseline.   May need placement if unable to ambulate- accepted by SNF, pending Level II by [facility]  Assist with meals

## 2022-12-26 NOTE — ASSESSMENT & PLAN NOTE
CT head no acute findings  MRI non-diagnostic due to movement  Obtain chest and pelvic x-ray  Continue PT/OT       Will need higher level of care with possibly SNF placement PT OT eval and treat.- Group home will take patient back if able to improve, request SNF for PT    CTH showed no acute abnormality   Will obtain CTA head and neck negative but did show microvascular changes   Case discussed with Dr Morris and we collectively concluded that patient may have had a small CVA due to deficits he is exhibiting but unable to confirm as we are unable to obtain an MRI   DAPT for 30 days followed by ASA only thereafter    Echo essentialy WNL   SLP rec crushed meds and dysphagia diet    Current:   Continue DAPT for 30 days   Continue PT OT  Pending placement- needs level II, pending

## 2022-12-27 PROCEDURE — 25000003 PHARM REV CODE 250: Performed by: NURSE PRACTITIONER

## 2022-12-27 PROCEDURE — G0378 HOSPITAL OBSERVATION PER HR: HCPCS

## 2022-12-27 PROCEDURE — 97535 SELF CARE MNGMENT TRAINING: CPT

## 2022-12-27 PROCEDURE — 97530 THERAPEUTIC ACTIVITIES: CPT

## 2022-12-27 PROCEDURE — 63600175 PHARM REV CODE 636 W HCPCS: Performed by: NURSE PRACTITIONER

## 2022-12-27 RX ADMIN — PREDNISONE 20 MG: 20 TABLET ORAL at 08:12

## 2022-12-27 RX ADMIN — LEVOTHYROXINE SODIUM 88 MCG: 50 TABLET ORAL at 05:12

## 2022-12-27 RX ADMIN — CLOPIDOGREL BISULFATE 75 MG: 75 TABLET ORAL at 08:12

## 2022-12-27 RX ADMIN — ASPIRIN 81 MG: 81 TABLET, COATED ORAL at 08:12

## 2022-12-27 NOTE — PLAN OF CARE
Patient remained free from falls throughout shift, call bell within reach. No complaints of pain or discomfort, did not sleep very much over night. Max assist to BSCC. Awaiting placement. No IV, MD aware. Vitals stable, will continue to monitor

## 2022-12-27 NOTE — ASSESSMENT & PLAN NOTE
Usually ambulates without assistance at group home, group home will accept back if he gains back the ability to ambulate and return closer to his previous baseline.   May need placement if unable to ambulate- accepted by SNF, pending Level II by [facility]  Assist with meals

## 2022-12-27 NOTE — PLAN OF CARE
Oriented to self. NADN.  Pt remains free of falls. Up to BSC with x2 assistance.  OOB with therapy today.  Avasys in room.   No complaints at this time.  Safety measures in place. Will continue to monitor.

## 2022-12-27 NOTE — PT/OT/SLP PROGRESS
Occupational Therapy   Treatment    Name: Albaro Langley  MRN: [id number]  Admitting Diagnosis:  Acute weakness       Recommendations:     Discharge Recommendations: nursing facility, skilled  Discharge Equipment Recommendations:   (TBD at next level of care)  Barriers to discharge:  Decreased caregiver support    Assessment:     Albaro Langley is a 52 y.o. male with a medical diagnosis of Acute weakness.  He presents with the following performance deficits affecting function are weakness, impaired endurance, impaired self care skills, impaired functional mobility, impaired balance, impaired cognition, decreased coordination, impaired fine motor, decreased safety awareness, impaired cardiopulmonary response to activity.     Rehab Prognosis:  Fair; patient would benefit from acute skilled OT services to address these deficits and reach maximum level of function.       Plan:     Patient to be seen 2 x/week to address the above listed problems via self-care/home management, therapeutic activities, therapeutic exercises  Plan of Care Expires: 01/02/23  Plan of Care Reviewed with: patient    Subjective     Pain/Comfort:  Pain Rating 1:  (no nonverbal indicators of pain)    Objective:     Communicated with: Nurse, Nishi, prior to session.  Patient found supine with telemetry, peripheral IV, bed alarm (blanca sys) upon OT entry to room.    General Precautions: Standard, fall    Orthopedic Precautions:N/A  Braces: N/A  Respiratory Status: Room air    Bed Mobility:    Patient completed Supine to Sit with contact guard assistance with significant increase in time and continuous verbal encouragement throughout.  Upon transition to EOB patient requiring waxing and waning assistance. Noted to have severe lethargy. Initially with significant posterior instability (max A to maintain upright), but improved to CGA after television in room was turned on and patient was more awake. Sat EOB x15 mins in attempts to improve balance and  increased level of arousal prior to transfer to chair.    Functional Mobility/Transfers:  Patient completed Sit <> Stand Transfer with minimum assistance  with  no assistive device   Patient completed Bed <> Chair Transfer using Stand Pivot technique with minimum assistance with no assistive device transfer to R side.  Patient provided with max encouragement to transfer to chair. Therapist allowed patient to guide movement guarding throughout for generalized safety.  After transfer to chair patient requiring max A for sit>stand to place chair alarm pad and pull up pants 2/2 ? Desire to complete  Despite efforts, therapist unable to encourage patient to attempt functional mobility trials with A of 2 people.    Activities of Daily Living:  Upper Body Dressing: moderate assistance donned paper scrub top while seated in bedside chair  Lower Body Dressing: maximal assistance donning paper scrub pants from bedside chair with max cueing for engagement.    [facility] 6 Click ADL: 15    Treatment & Education:  Engagement in volitional movement fluctuates significantly throughout treatment session. Lethargy improved after television stimuli added and transfer to chair was completed. Due to cognitive deficits increased time for session is required. Patient coloring as desired while in bedside chair with B hands equally with poor quality.     Patient left up in chair with all lines intact, call button in reach, chair alarm on, and nurse notified    GOALS:   Multidisciplinary Problems       Occupational Therapy Goals          Problem: Occupational Therapy    Goal Priority Disciplines Outcome Interventions   Occupational Therapy Goal     OT, PT/OT Ongoing, Progressing    Description: Goals to be met by: 1/2/23     Patient will increase functional independence with ADLs by performing:    Toileting from bedside commode with Maximum Assistance for hygiene and clothing management.   Toilet transfer to bedside commode with Maximum  Assistance.  Increased functional strength in B UE grossly to WFL.                         Time Tracking:     OT Date of Treatment: 12/27/22  OT Start Time: 0930  OT Stop Time: 1010  OT Total Time (min): 40 min    Billable Minutes:Self Care/Home Management 15  Therapeutic Activity 25    12/27/2022

## 2022-12-27 NOTE — PT/OT/SLP PROGRESS
Physical Therapy Treatment    Patient Name:  Albaro Langley   MRN:  [id number]    Recommendations:     Discharge Recommendations: nursing facility, skilled  Discharge Equipment Recommendations:  (TBD AT NEXT LEVEL OF CARE)  Barriers to discharge: None    Assessment:     Albaro Langley is a 52 y.o. male admitted with a medical diagnosis of Acute weakness.  He presents with the following impairments/functional limitations: weakness, impaired endurance, impaired cognition, impaired functional mobility, impaired balance, decreased coordination.    Rehab Prognosis: Fair; patient would benefit from acute skilled PT services to address these deficits and reach maximum level of function.    Recent Surgery: * No surgery found *   Plan:     During this hospitalization, patient to be seen 3 x/week to address the identified rehab impairments via gait training, therapeutic activities, therapeutic exercises and progress toward the following goals:    Plan of Care Expires:  01/02/23    Subjective     Chief Complaint:   Patient/Family Comments/goals:   Pain/Comfort:  Pain Rating 1: 0/10      Objective:     Communicated with NURSE PORRAS prior to session.  Patient found supine with telemetry, peripheral IV, bed alarm (MAUREEN SYS) upon PT entry to room.     General Precautions: Standard, fall  Orthopedic Precautions: N/A  Braces: N/A  Respiratory Status: Room air     Functional Mobility:  Bed Mobility:     Rolling Left:  contact guard assistance  Scooting: contact guard assistance  Supine to Sit: contact guard assistance  Transfers:     Sit to 1/2 Stand:  minimum assistance with no AD, APPROX. 6 TRIALS WITH CUES FOR UPRIGHT POSTURE, UNABLE TO STAND ERECT  Bed to Chair: minimum assistance with  no AD  using  Step Transfer, TF'S BETTER TOWARD R SIDE  Balance: FAIR SITTING BALANCE INITIALLY BUT LIMITED BY LETHARGY REQUIRING MORE ASSIST TO MAINTAIN UPRIGHT POSTURE TO POOR SITTING BALANCE, POOR STANDING BALANCE    AM-PAC 6 CLICK  "MOBILITY  Turning over in bed (including adjusting bedclothes, sheets and blankets)?: 3  Sitting down on and standing up from a chair with arms (e.g., wheelchair, bedside commode, etc.): 3  Moving from lying on back to sitting on the side of the bed?: 3  Moving to and from a bed to a chair (including a wheelchair)?: 3  Need to walk in hospital room?: 1  Climbing 3-5 steps with a railing?: 1  Basic Mobility Total Score: 14     Treatment & Education:  PT WITH INCREASED RESTLESSNESS, REQUIRING MAXA FOR CHANGING GOWN AND PANTS, IMPROVED MENTATION ONCE MORE AROUSED  PT EDUCATED ON RISK FOR FALLS DUE TO GENERALIZED WEAKNESS, EDUCATED ON "CALL DON'T FALL", ENCOURAGED TO CALL FOR ASSISTANCE WITH ALL NEEDS SUCH AS BED<>CHAIR TRANSFERS OR TRIPS TO BATHROOM, PT AGREEABLE TO SAFETY PRECAUTIONS    Patient left up in chair with all lines intact, call button in reach, chair alarm on, and NURSE notified..    GOALS:   Multidisciplinary Problems       Physical Therapy Goals          Problem: Physical Therapy    Goal Priority Disciplines Outcome Goal Variances Interventions   Physical Therapy Goal     PT, PT/OT Ongoing, Progressing     Description: LTG'S TO BE MET IN 14 DAYS (1-2-23)  PT WILL REQUIRE MODA FOR BED MOBILITY  PT WILL REQUIRE MODA FOR BED<>CHAIR TF'S  PT WILL  FEET WITH OR W/O RW AND MODA                         Time Tracking:     PT Received On: 12/27/22  PT Start Time: 0930     PT Stop Time: 0953  PT Total Time (min): 23 min     Billable Minutes: Therapeutic Activity 23    Treatment Type: Treatment  PT/PTA: PT     PTA Visit Number: 0     12/27/2022  "

## 2022-12-27 NOTE — SUBJECTIVE & OBJECTIVE
Interval History:  NAEON . He is following commands .  No new neurological deficit .  Plan to repeat CT head .     Review of Systems   Constitutional:  Positive for fatigue.   HENT: Negative.     Eyes: Negative.    Cardiovascular: Negative.    Gastrointestinal: Negative.    Endocrine: Negative.    Genitourinary: Negative.    Musculoskeletal: Negative.    Allergic/Immunologic: Negative.    Neurological:  Positive for weakness.   Hematological: Negative.    Psychiatric/Behavioral: Negative.     Objective:     Vital Signs (Most Recent):  Temp: 97.3 °F (36.3 °C) (12/27/22 1058)  Pulse: 66 (12/27/22 1058)  Resp: 17 (12/27/22 1058)  BP: (!) 109/51 (12/27/22 1058)  SpO2: 98 % (12/27/22 1058)   Vital Signs (24h Range):  Temp:  [97.3 °F (36.3 °C)-98.2 °F (36.8 °C)] 97.3 °F (36.3 °C)  Pulse:  [57-73] 66  Resp:  [17-20] 17  SpO2:  [94 %-100 %] 98 %  BP: (109-114)/(51-75) 109/51     Weight: 61.9 kg (136 lb 7.4 oz)  Body mass index is 24.17 kg/m².    Intake/Output Summary (Last 24 hours) at 12/27/2022 1421  Last data filed at 12/27/2022 1308  Gross per 24 hour   Intake 1200 ml   Output --   Net 1200 ml      Physical Exam  Vitals and nursing note reviewed.   HENT:      Head: Normocephalic and atraumatic.   Eyes:      Pupils: Pupils are equal, round, and reactive to light.   Cardiovascular:      Rate and Rhythm: Normal rate and regular rhythm.      Heart sounds: No murmur heard.  Pulmonary:      Effort: Pulmonary effort is normal. No respiratory distress.      Breath sounds: Normal breath sounds. No wheezing.   Abdominal:      General: Bowel sounds are normal. There is no distension.      Palpations: Abdomen is soft.      Tenderness: There is no abdominal tenderness.   Musculoskeletal:         General: No swelling.      Cervical back: Normal range of motion and neck supple. No rigidity or tenderness.   Skin:     General: Skin is warm and dry.   Neurological:      Mental Status: He is alert.      Motor: Weakness present.       Comments: Able to feed himself with DAVID       Significant Labs: All pertinent labs within the past 24 hours have been reviewed.      Significant Imaging: I have reviewed all pertinent imaging results/findings within the past 24 hours.

## 2022-12-27 NOTE — PROGRESS NOTES
[facility] ([facility] Medicine  Progress Note    Patient Name: Albaro Langley  MRN: [id number]  Patient Class: OP- Observation   Admission Date: 12/18/2022  Length of Stay: 0 days  Attending Physician: Lowell Metz, *  Primary Care Provider: Primary Doctor No        Subjective:     Principal Problem:Acute weakness        HPI:  The patient is 53 yo male with past medical history of Down's syndrome and hypothyroidism who presented to the ED with acute onset of weakness. He was last seen normal around 1900. His caregiver woke him of around 0530 this morning and told him get dressed. The patient fell and was unable to dress himself. He also seemed confused per the caregiver. He was transported to ED via EMS as caregiver concerned patient may have had a stroke given his weakness and leaning to the left. His brother and sister were at bedside. Patient recently started soiling his pants. CT head no acute findings. Patient  unable to complete MRI. Hospital medicine consulted. Discussed additional labs and repeat head CT in am. Patient placed in observation.      Overview/Hospital Course:  Admitted for change in mentation. Unable to lay still for MRI. CTH showed no acute abnormality. Will obtain CTA head and neck negative but did show microvascular changes. Case discussed with Dr Morris and we collectively concluded that patient may have had a small CVA due to deficits he is exhibiting but unable to confirm as we are unable to obtain an MRI. Plan to initiate DAPT for 30 days followed by ASA only thereafter.  Echo essentialy WNL. SLP rec crushed meds and dysphagia diet. Placement will be difficult in nature due to Medicaid and disability. Toe inflammation resolved. Patient markedly improved and smiling, able to communicate with simple words and gestures, able to follow simple commands. Sitting in bed, activities being provided, able to feed self. Continue working with PT OT. Labs stable.            Interval History:  NAEON . He is following commands .  No new neurological deficit .  Plan to repeat CT head .     Review of Systems   Constitutional:  Positive for fatigue.   HENT: Negative.     Eyes: Negative.    Cardiovascular: Negative.    Gastrointestinal: Negative.    Endocrine: Negative.    Genitourinary: Negative.    Musculoskeletal: Negative.    Allergic/Immunologic: Negative.    Neurological:  Positive for weakness.   Hematological: Negative.    Psychiatric/Behavioral: Negative.     Objective:     Vital Signs (Most Recent):  Temp: 97.3 °F (36.3 °C) (12/27/22 1058)  Pulse: 66 (12/27/22 1058)  Resp: 17 (12/27/22 1058)  BP: (!) 109/51 (12/27/22 1058)  SpO2: 98 % (12/27/22 1058)   Vital Signs (24h Range):  Temp:  [97.3 °F (36.3 °C)-98.2 °F (36.8 °C)] 97.3 °F (36.3 °C)  Pulse:  [57-73] 66  Resp:  [17-20] 17  SpO2:  [94 %-100 %] 98 %  BP: (109-114)/(51-75) 109/51     Weight: 61.9 kg (136 lb 7.4 oz)  Body mass index is 24.17 kg/m².    Intake/Output Summary (Last 24 hours) at 12/27/2022 1421  Last data filed at 12/27/2022 1308  Gross per 24 hour   Intake 1200 ml   Output --   Net 1200 ml      Physical Exam  Vitals and nursing note reviewed.   HENT:      Head: Normocephalic and atraumatic.   Eyes:      Pupils: Pupils are equal, round, and reactive to light.   Cardiovascular:      Rate and Rhythm: Normal rate and regular rhythm.      Heart sounds: No murmur heard.  Pulmonary:      Effort: Pulmonary effort is normal. No respiratory distress.      Breath sounds: Normal breath sounds. No wheezing.   Abdominal:      General: Bowel sounds are normal. There is no distension.      Palpations: Abdomen is soft.      Tenderness: There is no abdominal tenderness.   Musculoskeletal:         General: No swelling.      Cervical back: Normal range of motion and neck supple. No rigidity or tenderness.   Skin:     General: Skin is warm and dry.   Neurological:      Mental Status: He is alert.      Motor: Weakness present.       Comments: Able to feed himself with RUE       Significant Labs: All pertinent labs within the past 24 hours have been reviewed.      Significant Imaging: I have reviewed all pertinent imaging results/findings within the past 24 hours.        Assessment/Plan:      * Acute weakness  CT head no acute findings  MRI non-diagnostic due to movement  Obtain chest and pelvic x-ray  Continue PT/OT       Will need higher level of care with possibly SNF placement PT OT eval and treat.- Group home will take patient back if able to improve, request SNF for PT    CTH showed no acute abnormality   Will obtain CTA head and neck negative but did show microvascular changes   Case discussed with Dr Morris and we collectively concluded that patient may have had a small CVA due to deficits he is exhibiting but unable to confirm as we are unable to obtain an MRI   DAPT for 30 days followed by ASA only thereafter    Echo essentialy WNL   SLP rec crushed meds and dysphagia diet    Current:   Continue DAPT for 30 days   Continue PT OT  Pending placement- needs level II, pending          Inflammation of toe  Does not necessarily appear like gout  Inflammation of bilateral LE toes  x1 dose Prednisone ordered  Gabapentin initiated as well    12/23:  Resolved with x1 dose prednisone          Down syndrome  Usually ambulates without assistance at group home, group home will accept back if he gains back the ability to ambulate and return closer to his previous baseline.   May need placement if unable to ambulate- accepted by SNF, pending Level II by [facility]  Assist with meals      Hypothyroid  Continue levothyroxine TSH elevated but free T4 within normal limits        VTE Risk Mitigation (From admission, onward)         Ordered     IP VTE LOW RISK PATIENT  Once         12/18/22 1653     Place sequential compression device  Until discontinued         12/18/22 1653                Discharge Planning   CAMPBELL:      Code Status: Full Code   Is the  patient medically ready for discharge?:     Reason for patient still in hospital (select all that apply): Treatment  Discharge Plan A: Skilled Nursing Facility                  Lowell Metz MD  Department of Hospital Medicine   [place] - Telemetry ([facility])

## 2022-12-27 NOTE — PLAN OF CARE
LIMITED BY INCREASED LETHARGY TODAY, CGA FOR BED MOBILITY, MARGIE FOR BED<>CHAIR TF WITH MAX CUES TO STAY ON TASK

## 2022-12-28 LAB — SARS-COV-2 RDRP RESP QL NAA+PROBE: NEGATIVE

## 2022-12-28 PROCEDURE — 25000003 PHARM REV CODE 250: Performed by: NURSE PRACTITIONER

## 2022-12-28 PROCEDURE — G0378 HOSPITAL OBSERVATION PER HR: HCPCS

## 2022-12-28 PROCEDURE — 63600175 PHARM REV CODE 636 W HCPCS: Performed by: NURSE PRACTITIONER

## 2022-12-28 PROCEDURE — U0002 COVID-19 LAB TEST NON-CDC: HCPCS | Performed by: NURSE PRACTITIONER

## 2022-12-28 RX ADMIN — LEVOTHYROXINE SODIUM 88 MCG: 50 TABLET ORAL at 06:12

## 2022-12-28 RX ADMIN — ASPIRIN 81 MG: 81 TABLET, COATED ORAL at 08:12

## 2022-12-28 RX ADMIN — CLOPIDOGREL BISULFATE 75 MG: 75 TABLET ORAL at 08:12

## 2022-12-28 RX ADMIN — PREDNISONE 20 MG: 20 TABLET ORAL at 08:12

## 2022-12-28 NOTE — PLAN OF CARE
12/28/22 1157   Post-Acute Status   Post-Acute Authorization Placement   Post-Acute Placement Status Pending payor review/awaiting authorization (if required)   Discharge Delays None known at this time   Discharge Plan   Discharge Plan A Skilled Nursing Facility     SW received notification that [employer] provided approval for patient to admit to NH level of care. SW to receive 142 today. [facility] notified and updated clinicals provided via [facility]. Facility to submit for authorization today. Facility requested COVID test.  SW to f/u to update pt's family.

## 2022-12-28 NOTE — PROGRESS NOTES
[facility] ([facility] Medicine  Progress Note    Patient Name: Albaro Langley  MRN: [id number]  Patient Class: OP- Observation   Admission Date: 12/18/2022  Length of Stay: 0 days  Attending Physician: Lowell Metz, *  Primary Care Provider: Primary Doctor No        Subjective:     Principal Problem:Acute weakness        HPI:  The patient is 51 yo male with past medical history of Down's syndrome and hypothyroidism who presented to the ED with acute onset of weakness. He was last seen normal around 1900. His caregiver woke him of around 0530 this morning and told him get dressed. The patient fell and was unable to dress himself. He also seemed confused per the caregiver. He was transported to ED via EMS as caregiver concerned patient may have had a stroke given his weakness and leaning to the left. His brother and sister were at bedside. Patient recently started soiling his pants. CT head no acute findings. Patient  unable to complete MRI. Hospital medicine consulted. Discussed additional labs and repeat head CT in am. Patient placed in observation.      Overview/Hospital Course:  Admitted for change in mentation. Unable to lay still for MRI. CTH showed no acute abnormality. Will obtain CTA head and neck negative but did show microvascular changes. Case discussed with Dr Morris and we collectively concluded that patient may have had a small CVA due to deficits he is exhibiting but unable to confirm as we are unable to obtain an MRI. Plan to initiate DAPT for 30 days followed by ASA only thereafter.  Echo essentialy WNL. SLP rec crushed meds and dysphagia diet. Toe inflammation resolved, with prednisone, given for 5 days then stopped.   Patient markedly improved and smiling, able to communicate with simple words and gestures, able to follow simple commands. Sitting in bed, activities being provided, able to feed self. Continue working with PT OT. Notified Level II approved, Hakan Morrissey  accepted, auth pending. COVID test ordered.           Interval History: Stable, Level II approved by [facility], Hakan Morrissey accepted, auth pending.     Review of Systems   Unable to perform ROS: Patient nonverbal (limited)   Gastrointestinal:  Negative for abdominal pain.   Musculoskeletal:         Foot pain-bilateral   Objective:     Vital Signs (Most Recent):  Temp: 97.5 °F (36.4 °C) (12/28/22 1314)  Pulse: 84 (12/28/22 1314)  Resp: 20 (12/28/22 1314)  BP: (!) 111/58 (12/28/22 1314)  SpO2: 98 % (12/28/22 1314)   Vital Signs (24h Range):  Temp:  [97.5 °F (36.4 °C)-98.9 °F (37.2 °C)] 97.5 °F (36.4 °C)  Pulse:  [51-91] 84  Resp:  [17-20] 20  SpO2:  [95 %-98 %] 98 %  BP: ()/(53-63) 111/58     Weight: 61.9 kg (136 lb 7.4 oz)  Body mass index is 24.17 kg/m².    Intake/Output Summary (Last 24 hours) at 12/28/2022 1353  Last data filed at 12/28/2022 0800  Gross per 24 hour   Intake 720 ml   Output 400 ml   Net 320 ml      Physical Exam  Vitals and nursing note reviewed.   HENT:      Head: Normocephalic and atraumatic.   Cardiovascular:      Rate and Rhythm: Normal rate and regular rhythm.      Heart sounds: No murmur heard.  Pulmonary:      Effort: Pulmonary effort is normal. No respiratory distress.      Breath sounds: Normal breath sounds. No wheezing.   Abdominal:      General: Bowel sounds are normal. There is no distension.      Palpations: Abdomen is soft.      Tenderness: There is no abdominal tenderness.   Musculoskeletal:         General: No swelling.   Skin:     General: Skin is warm and dry.   Neurological:      Mental Status: He is alert.      Motor: Weakness present.      Comments: Able to feed himself with RUE       Significant Labs: All pertinent labs within the past 24 hours have been reviewed.    Significant Imaging: I have reviewed all pertinent imaging results/findings within the past 24 hours.      Assessment/Plan:      * Acute weakness  CT head no acute findings  MRI non-diagnostic due to  movement  Obtain chest and pelvic x-ray  Continue PT/OT       Will need higher level of care with possibly SNF placement PT OT eval and treat.- Group home will take patient back if able to improve, request SNF for PT    CTH showed no acute abnormality   Will obtain CTA head and neck negative but did show microvascular changes   Case discussed with Dr Morris and we collectively concluded that patient may have had a small CVA due to deficits he is exhibiting but unable to confirm as we are unable to obtain an MRI   DAPT for 30 days followed by ASA only thereafter    Echo essentialy WNL   SLP rec crushed meds and dysphagia diet    Current:   Continue DAPT for 30 days   Continue PT OT  Pending placement- needs level II, approved 12/28/22, Hakan Morrissey accepted, auth pending          Inflammation of toe  Does not necessarily appear like gout  Inflammation of bilateral LE toes  x1 dose Prednisone ordered  Gabapentin initiated as well    Treated with Prednisone 20mg PO x5 days, resolved        Down syndrome  Usually ambulates without assistance at group home, group home will accept back if he gains back the ability to ambulate and return closer to his previous baseline.   May need placement if unable to ambulate- accepted by SNF, pending Level II by [facility], approved, Hakan Morrissey Accepted, Auth Pending  Assist with meals      Hypothyroid  Continue levothyroxine TSH elevated but free T4 within normal limits        VTE Risk Mitigation (From admission, onward)         Ordered     IP VTE LOW RISK PATIENT  Once         12/18/22 1653     Place sequential compression device  Until discontinued         12/18/22 1653                Discharge Planning   CAMPBELL:      Code Status: Full Code   Is the patient medically ready for discharge?:     Reason for patient still in hospital (select all that apply): Patient trending condition  Discharge Plan A: Skilled Nursing Facility   Discharge Delays: None known at this time              April  MARC Tomlin NP  Department of Hospital Medicine   '[place] - Telemetry ([facility])

## 2022-12-28 NOTE — ASSESSMENT & PLAN NOTE
Does not necessarily appear like gout  Inflammation of bilateral LE toes  x1 dose Prednisone ordered  Gabapentin initiated as well    Treated with Prednisone 20mg PO x5 days, resolved

## 2022-12-28 NOTE — SUBJECTIVE & OBJECTIVE
Interval History: Stable, Level II approved by [facility], Hakan Morrissey accepted, auth pending.     Review of Systems   Unable to perform ROS: Patient nonverbal (limited)   Gastrointestinal:  Negative for abdominal pain.   Musculoskeletal:         Foot pain-bilateral   Objective:     Vital Signs (Most Recent):  Temp: 97.5 °F (36.4 °C) (12/28/22 1314)  Pulse: 84 (12/28/22 1314)  Resp: 20 (12/28/22 1314)  BP: (!) 111/58 (12/28/22 1314)  SpO2: 98 % (12/28/22 1314)   Vital Signs (24h Range):  Temp:  [97.5 °F (36.4 °C)-98.9 °F (37.2 °C)] 97.5 °F (36.4 °C)  Pulse:  [51-91] 84  Resp:  [17-20] 20  SpO2:  [95 %-98 %] 98 %  BP: ()/(53-63) 111/58     Weight: 61.9 kg (136 lb 7.4 oz)  Body mass index is 24.17 kg/m².    Intake/Output Summary (Last 24 hours) at 12/28/2022 1353  Last data filed at 12/28/2022 0800  Gross per 24 hour   Intake 720 ml   Output 400 ml   Net 320 ml      Physical Exam  Vitals and nursing note reviewed.   HENT:      Head: Normocephalic and atraumatic.   Cardiovascular:      Rate and Rhythm: Normal rate and regular rhythm.      Heart sounds: No murmur heard.  Pulmonary:      Effort: Pulmonary effort is normal. No respiratory distress.      Breath sounds: Normal breath sounds. No wheezing.   Abdominal:      General: Bowel sounds are normal. There is no distension.      Palpations: Abdomen is soft.      Tenderness: There is no abdominal tenderness.   Musculoskeletal:         General: No swelling.   Skin:     General: Skin is warm and dry.   Neurological:      Mental Status: He is alert.      Motor: Weakness present.      Comments: Able to feed himself with RUE       Significant Labs: All pertinent labs within the past 24 hours have been reviewed.    Significant Imaging: I have reviewed all pertinent imaging results/findings within the past 24 hours.

## 2022-12-28 NOTE — PLAN OF CARE
Patient oriented to self only. NADN.  Pt remains free of falls. Up to BSC with x1 assistance.    No complaints at this time.  Safety measures in place. Will continue to monitor. Avasys in place. Bed alarm set.  No heart monitor, no IV..MD aware.

## 2022-12-28 NOTE — ASSESSMENT & PLAN NOTE
CT head no acute findings  MRI non-diagnostic due to movement  Obtain chest and pelvic x-ray  Continue PT/OT       Will need higher level of care with possibly SNF placement PT OT eval and treat.- Group home will take patient back if able to improve, request SNF for PT    CTH showed no acute abnormality   Will obtain CTA head and neck negative but did show microvascular changes   Case discussed with Dr Morris and we collectively concluded that patient may have had a small CVA due to deficits he is exhibiting but unable to confirm as we are unable to obtain an MRI   DAPT for 30 days followed by ASA only thereafter    Echo essentialy WNL   SLP rec crushed meds and dysphagia diet    Current:   Continue DAPT for 30 days   Continue PT OT  Pending placement- needs level II, approved 12/28/22, Hakan Morrissey accepted, auth pending

## 2022-12-28 NOTE — ASSESSMENT & PLAN NOTE
Usually ambulates without assistance at group home, group home will accept back if he gains back the ability to ambulate and return closer to his previous baseline.   May need placement if unable to ambulate- accepted by SNF, pending Level II by [facility], approved, Hakan Morrissey Accepted, Auth Pending  Assist with meals

## 2022-12-28 NOTE — PLAN OF CARE
Pt AAOx1. VSS. Pt remained free of falls this shift. Bed alarm and avasys at the bedside. No signs or symptoms of complaints of pain or discomfort. Medications administered as ordered. Pt is not on monitor. Hourly rounding completed. Pt instructed to call for assistance. POC reviewed with pt. Pt gestured appropriately.

## 2022-12-29 VITALS
HEART RATE: 88 BPM | RESPIRATION RATE: 18 BRPM | SYSTOLIC BLOOD PRESSURE: 108 MMHG | WEIGHT: 136.44 LBS | TEMPERATURE: 98 F | DIASTOLIC BLOOD PRESSURE: 54 MMHG | HEIGHT: 63 IN | OXYGEN SATURATION: 100 % | BODY MASS INDEX: 24.18 KG/M2

## 2022-12-29 PROBLEM — R53.81 DEBILITY: Status: ACTIVE | Noted: 2022-12-18

## 2022-12-29 PROCEDURE — 97530 THERAPEUTIC ACTIVITIES: CPT

## 2022-12-29 PROCEDURE — 97530 THERAPEUTIC ACTIVITIES: CPT | Mod: CQ

## 2022-12-29 PROCEDURE — 97535 SELF CARE MNGMENT TRAINING: CPT

## 2022-12-29 PROCEDURE — G0378 HOSPITAL OBSERVATION PER HR: HCPCS

## 2022-12-29 PROCEDURE — 25000003 PHARM REV CODE 250: Performed by: NURSE PRACTITIONER

## 2022-12-29 RX ORDER — ASPIRIN 81 MG/1
81 TABLET ORAL DAILY
Qty: 30 TABLET | Refills: 0 | Status: SHIPPED | OUTPATIENT
Start: 2022-12-30 | End: 2023-01-29

## 2022-12-29 RX ORDER — CLOPIDOGREL BISULFATE 75 MG/1
75 TABLET ORAL DAILY
Qty: 30 TABLET | Refills: 0 | Status: SHIPPED | OUTPATIENT
Start: 2022-12-30 | End: 2023-12-30

## 2022-12-29 RX ADMIN — ASPIRIN 81 MG: 81 TABLET, COATED ORAL at 09:12

## 2022-12-29 RX ADMIN — LEVOTHYROXINE SODIUM 88 MCG: 50 TABLET ORAL at 05:12

## 2022-12-29 RX ADMIN — CLOPIDOGREL BISULFATE 75 MG: 75 TABLET ORAL at 09:12

## 2022-12-29 NOTE — PT/OT/SLP PROGRESS
Occupational Therapy   Treatment    Name: Albaro Langley  MRN: [id number]  Admitting Diagnosis:  Acute weakness       Recommendations:     Discharge Recommendations: nursing facility, skilled  Discharge Equipment Recommendations:   (TBD at next level of care)  Barriers to discharge:  Decreased caregiver support    Assessment:     Albaro Langley is a 52 y.o. male with a medical diagnosis of Acute weakness.  He presents with the following performance deficits affecting function are weakness, impaired endurance, impaired self care skills, impaired functional mobility, impaired balance, impaired cognition, decreased safety awareness, impaired cardiopulmonary response to activity, impaired fine motor, impaired coordination.     Rehab Prognosis:  Fair; patient would benefit from acute skilled OT services to address these deficits and reach maximum level of function.       Plan:     Patient to be seen 2 x/week to address the above listed problems via self-care/home management, therapeutic activities, therapeutic exercises  Plan of Care Expires: 01/02/23  Plan of Care Reviewed with: patient    Subjective     Pain/Comfort:  Pain Rating 1: 0/10 (no nonverbal indicators of pain throughout)    Objective:     Communicated with: Nurse, Alex, prior to session.  Patient found supine with bed alarm, telemetry, peripheral IV (blanca sys) upon OT entry to room.    General Precautions: Standard, fall    Orthopedic Precautions:N/A  Braces: N/A  Respiratory Status: Room air    Bed Mobility:    Patient completed Supine to Sit with stand by assistance     Functional Mobility/Transfers:  Patient completed Sit <> Stand Transfer with contact guard assistance  with  no assistive device   Patient completed Bed <> Chair Transfer using Stand Pivot technique with contact guard assistance with no assistive device transferring to chair on right.  Sit>stand from bedside chair with min A    Activities of Daily Living:  Feeding:  maximal assistance patient  with poor attention to task and poor FMC of R hand requiring increased A with self feeding to promote PO intake.  Lower Body Dressing: maximal assistance donning paper scrub bottoms from bedside chair in standing. Able to grasp poorly with L hand and attempt to pull up pants.    [facility] 6 Click ADL: 13    Treatment & Education:  Patient tolerated intervention well. Continues to improve with functional transfers. Decreased encouragement required this date overall. Allowed for flex posturing while holding onto bedside chair for 90* turn with pivot.     Patient left up in chair with all lines intact, call button in reach, and chair alarm on    GOALS:   Multidisciplinary Problems       Occupational Therapy Goals          Problem: Occupational Therapy    Goal Priority Disciplines Outcome Interventions   Occupational Therapy Goal     OT, PT/OT Ongoing, Progressing    Description: Goals to be met by: 1/2/23     Patient will increase functional independence with ADLs by performing:    Toileting from bedside commode with Maximum Assistance for hygiene and clothing management.   Toilet transfer to bedside commode with Maximum Assistance.  Increased functional strength in B UE grossly to WFL.                         Time Tracking:     OT Date of Treatment: 12/29/22  OT Start Time: 0740  OT Stop Time: 0805  OT Total Time (min): 25 min    Billable Minutes:Self Care/Home Management 15  Therapeutic Activity 10    12/29/2022

## 2022-12-29 NOTE — DISCHARGE SUMMARY
[facility] - [facility] ([facility])  [facility] Medicine  Discharge Summary      Patient Name: Albaro Langley  MRN: [id number]  EMI: [id number]  Patient Class: OP- Observation  Admission Date: 12/18/2022  Hospital Length of Stay: 0 days  Discharge Date and Time:  12/29/2022 3:39 PM  Attending Physician: Lowell Metz, *   Discharging Provider: Joselin Tomlin NP  Primary Care Provider: Primary Doctor Sunshine    Primary Care Team: Networked reference to record PCT     HPI:   The patient is 53 yo male with past medical history of Down's syndrome and hypothyroidism who presented to the ED with acute onset of weakness. He was last seen normal around 1900. His caregiver woke him of around 0530 this morning and told him get dressed. The patient fell and was unable to dress himself. He also seemed confused per the caregiver. He was transported to ED via EMS as caregiver concerned patient may have had a stroke given his weakness and leaning to the left. His brother and sister were at bedside. Patient recently started soiling his pants. CT head no acute findings. Patient  unable to complete MRI. Hospital medicine consulted. Discussed additional labs and repeat head CT in am. Patient placed in observation.      * No surgery found *      Hospital Course:   Admitted for change in mentation. Unable to lay still for MRI. CTH showed no acute abnormality. Will obtain CTA head and neck negative but did show microvascular changes. Case discussed with Dr Morris and we collectively concluded that patient may have had a small CVA due to deficits he is exhibiting but unable to confirm as we are unable to obtain an MRI. Plan to initiate DAPT for 30 days followed by ASA only thereafter.  Echo essentialy WNL. SLP rec crushed meds and dysphagia diet. Toe inflammation resolved, with prednisone, given for 5 days then stopped.   Patient markedly improved and smiling, able to communicate with simple words and gestures, able to follow simple  commands. Sitting in bed, activities being provided, able to feed self. Continue working with PT OT. Notified Level II approved, [facility] accepted, auth received. COVID test: negative. Plan for discharge to SNF today. Patient seen and examined on day of discharge and determined stable for discharge.            Goals of Care Treatment Preferences:  Code Status: Full Code      Consults:   Consults (From admission, onward)        Status Ordering Provider     Inpatient consult to Social Work  Once        Provider:  (Not yet assigned)    Completed ELISA EARL     Inpatient consult to Social Work  Once        Provider:  (Not yet assigned)    Completed ERIC SIDHU     Inpatient consult to Hospitalist  Once        Provider:  (Not yet assigned)    Acknowledged GONZALEZ MEDINA          * Debility  CT head no acute findings  MRI non-diagnostic due to movement  Obtain chest and pelvic x-ray  Continue PT/OT       Will need higher level of care with possibly SNF placement PT OT eval and treat.- Group home will take patient back if able to improve, request SNF for PT    CTH showed no acute abnormality   Will obtain CTA head and neck negative but did show microvascular changes   Case discussed with Dr Sidhu and we collectively concluded that patient may have had a small CVA due to deficits he is exhibiting but unable to confirm as we are unable to obtain an MRI   DAPT for 30 days followed by ASA only thereafter    Echo essentialy WNL   SLP rec crushed meds and dysphagia diet    Current:   Continue DAPT for 30 days   Continue PT OT  Pending placement- needs level II, approved 12/28/22, [facility] accepted, auth received, transfer to SNF 12/29/22            Final Active Diagnoses:    Diagnosis Date Noted POA    PRINCIPAL PROBLEM:  Debility [R53.81] 12/18/2022 Yes    Inflammation of toe [L08.9] 12/22/2022 Yes    Hypothyroid [E03.9] 12/18/2022 Yes    Down syndrome [Q90.9] 12/18/2022 Not Applicable       Problems Resolved During this Admission:       Discharged Condition: stable    Disposition: Skilled Nursing Facility    Follow Up:   Follow-up Information     [facility] Follow up today.    Contact information:  [address] [address]                     Patient Instructions:      Diet Adult Regular     Notify your health care provider if you experience any of the following:  temperature >100.4     Notify your health care provider if you experience any of the following:  persistent nausea and vomiting or diarrhea     Notify your health care provider if you experience any of the following:  severe uncontrolled pain     No dressing needed     Activity as tolerated       Significant Diagnostic Studies: Labs: All labs within the past 24 hours have been reviewed  Radiology: All imaging studies reviewed.     Pending Diagnostic Studies:     None         Medications:  Reconciled Home Medications:      Medication List      START taking these medications    aspirin 81 MG EC tablet  Commonly known as: ECOTRIN  Take 1 tablet (81 mg total) by mouth once daily.  Start taking on: December 30, 2022     clopidogreL 75 mg tablet  Commonly known as: PLAVIX  Take 1 tablet (75 mg total) by mouth once daily.  Start taking on: December 30, 2022        CONTINUE taking these medications    levothyroxine 88 MCG tablet  Commonly known as: SYNTHROID  Take 88 mcg by mouth before breakfast.     loratadine 10 mg tablet  Commonly known as: CLARITIN  Take 10 mg by mouth once daily.            Indwelling Lines/Drains at time of discharge:   Lines/Drains/Airways     None                 Time spent on the discharge of patient: >60 minutes         Joselin Tomlin NP  Department of Hospital Medicine  [facility] - Tele[facility] ([facility])

## 2022-12-29 NOTE — ASSESSMENT & PLAN NOTE
CT head no acute findings  MRI non-diagnostic due to movement  Obtain chest and pelvic x-ray  Continue PT/OT       Will need higher level of care with possibly SNF placement PT OT eval and treat.- Group home will take patient back if able to improve, request SNF for PT    CTH showed no acute abnormality   Will obtain CTA head and neck negative but did show microvascular changes   Case discussed with Dr Morris and we collectively concluded that patient may have had a small CVA due to deficits he is exhibiting but unable to confirm as we are unable to obtain an MRI   DAPT for 30 days followed by ASA only thereafter    Echo essentialy WNL   SLP rec crushed meds and dysphagia diet    Current:   Continue DAPT for 30 days   Continue PT OT  Pending placement- needs level II, approved 12/28/22, [facility] accepted, auth received, transfer to SNF 12/29/22

## 2022-12-29 NOTE — PLAN OF CARE
Pt AAOx4. VSS. Pt remained free of falls this shift. No observable signs of complaints of pain or discomfort. Medications administered as ordered. Pt is not on monitor. Hourly rounding completed. Pt instructed to call for assistance. POC reviewed.

## 2022-12-29 NOTE — PT/OT/SLP PROGRESS
Physical Therapy  Treatment    Albaro Langley   MRN: [id number]   Admitting Diagnosis: Acute weakness    PT Received On: 12/29/22  PT Start Time: 0755     PT Stop Time: 0810    PT Total Time (min): 15 min       Billable Minutes:  Therapeutic Activity 15    Treatment Type: Treatment  PT/PTA: PTA     PTA Visit Number: 1       General Precautions: Standard, fall  Orthopedic Precautions: N/A  Braces: N/A  Respiratory Status: Room air         Subjective:  Communicated with patient's nurse, Alex, and completed Epic chart review prior to session.  Patient agreed to PT session.     Pain/Comfort  Pain Rating 1: 0/10  Pain Rating Post-Intervention 1: 0/10    Objective:   Patient found with: bed alarm, blood pressure cuff, Other (comments) (AVASYS)    Supine > sit EOB: SBA    Forward scoot towards EOB: CGA    STS from EOB  No AD: CGA     Stand pivot T/F EOB > chair w/ HHAx2: CGA-Min A     STS from chair No AD: Min A     Stand x2 min w/ CGA for patient to pull up pants    Educated patient on importance of increased tolerance to upright position and direct impact on CV endurance and strength. Patient encouraged to sit up in chair/ EOB, for a minimum of 2 consecutive hours, 3x per day. Encouraged patient to perform AROM TE to BLE throughout the day within all available planes of motion. Re enforced importance of utilizing call light to meet needs in room and not attempt to get up without staff assistance. Unsure of level of retention due to patient's cognition.       AM-PAC 6 CLICK MOBILITY  How much help from another person does this patient currently need?   1 = Unable, Total/Dependent Assistance  2 = A lot, Maximum/Moderate Assistance  3 = A little, Minimum/Contact Guard/Supervision  4 = None, Modified [place]/Independent    Turning over in bed (including adjusting bedclothes, sheets and blankets)?: 3  Sitting down on and standing up from a chair with arms (e.g., wheelchair, bedside commode, etc.): 3  Moving from lying on  back to sitting on the side of the bed?: 3  Moving to and from a bed to a chair (including a wheelchair)?: 3  Need to walk in hospital room?: 1  Climbing 3-5 steps with a railing?: 1  Basic Mobility Total Score: 14    AM-PAC Raw Score CMS G-Code Modifier Level of Impairment Assistance   6 % Total / Unable   7 - 9 CM 80 - 100% Maximal Assist   10 - 14 CL 60 - 80% Moderate Assist   15 - 19 CK 40 - 60% Moderate Assist   20 - 22 CJ 20 - 40% Minimal Assist   23 CI 1-20% SBA / CGA   24 CH 0% Independent/ Mod I     Patient left up in chair with call button in reach, chair alarm on, and AVASYS present.    Assessment:  Albaro Langley is a 52 y.o. male with a medical diagnosis of Acute weakness and presents with overall decline in functional mobility. Patient would continue to benefit from skilled PT to address functional limitations listed below in order to return to PLOF/decrease caregiver burden.      Rehab identified problem list/impairments: weakness, impaired endurance, impaired cognition, impaired functional mobility, impaired balance, decreased coordination    Rehab potential is fair.    Activity tolerance: Fair    Discharge recommendations: nursing facility, skilled      Barriers to discharge:      Equipment recommendations: other (see comments) (TBD BY NEXT LEVEL OF CARE)     GOALS:   Multidisciplinary Problems       Physical Therapy Goals          Problem: Physical Therapy    Goal Priority Disciplines Outcome Goal Variances Interventions   Physical Therapy Goal     PT, PT/OT Ongoing, Progressing     Description: LTG'S TO BE MET IN 14 DAYS (1-2-23)  PT WILL REQUIRE MODA FOR BED MOBILITY  PT WILL REQUIRE MODA FOR BED<>CHAIR TF'S  PT WILL  FEET WITH OR W/O RW AND MODA                         PLAN:    Patient to be seen 3 x/week to address the above listed problems via gait training, therapeutic activities, therapeutic exercises  Plan of Care expires: 01/02/23  Plan of Care reviewed with: patient          12/29/2022

## 2022-12-29 NOTE — PLAN OF CARE
[facility] - Tele[facility] (Hospital)  Discharge Final Note    Primary Care Provider: Primary Doctor No    Expected Discharge Date: 12/29/2022    Final Discharge Note (most recent)       Final Note - 12/29/22 1529          Final Note    Assessment Type Final Discharge Note     Anticipated Discharge Disposition Skilled Nursing Facility     Hospital Resources/Appts/Education Provided Post-Acute resouces added to AVS        Post-Acute Status    Post-Acute Authorization Placement     Post-Acute Placement Status Set-up Complete/Auth obtained     Discharge Delays None known at this time                   [facility] NP notified to place discharge orders. SW to provide discharge clinicals via [facility]. RN provided with number for report; SW awaiting ETA for transportation.    No CM needs for discharge.     Important Message from Medicare             Contact Info       CAMI SM[facility]    [address] [address]       Next Steps: Follow up today

## 2022-12-29 NOTE — CONSULTS
[facility]. SW on hold with [facility] for auth status update. SW to f/u.     Pt's sister, Maurizio, complete admission paperwork at SNF today.

## 2022-12-29 NOTE — PLAN OF CARE
MARIA LUZ notified Mr. Acevedo with pt's group home that patient is discharging to [facility] today. Contact information for facility provided to Laura Kristina

## 2025-03-03 NOTE — PLAN OF CARE
Called and spoke to daughter. Appt r/s to 3/12at [facility].   Tolerated intervention well. Continued improvements in stand>pivot transfer. Recommending SNF at d/c.